# Patient Record
Sex: MALE | Race: WHITE | Employment: FULL TIME | URBAN - METROPOLITAN AREA
[De-identification: names, ages, dates, MRNs, and addresses within clinical notes are randomized per-mention and may not be internally consistent; named-entity substitution may affect disease eponyms.]

---

## 2017-07-12 ENCOUNTER — TRANSFERRED RECORDS (OUTPATIENT)
Dept: HEALTH INFORMATION MANAGEMENT | Facility: CLINIC | Age: 57
End: 2017-07-12

## 2017-07-27 ENCOUNTER — OFFICE VISIT (OUTPATIENT)
Dept: FAMILY MEDICINE | Facility: CLINIC | Age: 57
End: 2017-07-27
Payer: OTHER MISCELLANEOUS

## 2017-07-27 VITALS
HEIGHT: 73 IN | BODY MASS INDEX: 31.01 KG/M2 | TEMPERATURE: 97 F | WEIGHT: 234 LBS | OXYGEN SATURATION: 96 % | SYSTOLIC BLOOD PRESSURE: 126 MMHG | HEART RATE: 60 BPM | DIASTOLIC BLOOD PRESSURE: 76 MMHG

## 2017-07-27 DIAGNOSIS — Z23 NEED FOR TDAP VACCINATION: ICD-10-CM

## 2017-07-27 DIAGNOSIS — Z48.02 ENCOUNTER FOR REMOVAL OF SUTURES: ICD-10-CM

## 2017-07-27 DIAGNOSIS — S61.411D LACERATION OF RIGHT HAND WITHOUT FOREIGN BODY, SUBSEQUENT ENCOUNTER: Primary | ICD-10-CM

## 2017-07-27 PROCEDURE — 99203 OFFICE O/P NEW LOW 30 MIN: CPT | Mod: 25 | Performed by: FAMILY MEDICINE

## 2017-07-27 PROCEDURE — 90715 TDAP VACCINE 7 YRS/> IM: CPT | Performed by: FAMILY MEDICINE

## 2017-07-27 PROCEDURE — 90471 IMMUNIZATION ADMIN: CPT | Performed by: FAMILY MEDICINE

## 2017-07-27 NOTE — PROGRESS NOTES
SUBJECTIVE:                                                    Boris Perry is a 56 year old male who presents to clinic today for the following health issues:      ED/UC Followup:    Facility:  [facility]  Date of visit: 7/12/17  Reason for visit: Patient cut the knuckle on the index finger on right hand and it wouldn't stop bleeding  Current Status: Needs stitch removal       He suffered a cut at work 15 days ago on the dorsum of his right hand. He had 4 sutures placed. He was advised to return in 10-14 days for suture removal.  I note that he is due for a tetanus booster. They did not give him one in the ER.  The wound seems to be healing fine for him.    Problem list and histories reviewed & adjusted, as indicated.  Additional history: as documented    Patient Active Problem List   Diagnosis     MEDIAL MENISCUS TEAR - left     CHONDROMALACIA, KNEE - bilateral     S/P arthroscopy of right knee     Hyperlipidemia with target LDL less than 130     Advanced directives, counseling/discussion     S/P arthroscopy of left knee     Obesity (BMI 30.0-34.9)     Past Surgical History:   Procedure Laterality Date     ARTHROSCOPY KNEE Left      ARTHROSCOPY KNEE Right     Dr. Correa     ARTHROSCOPY KNEE Left 2/27/2015    Procedure: ARTHROSCOPY KNEE;  Surgeon: Reagan Youssef MD;  Location: [facility] OR     C NONSPECIFIC PROCEDURE  1999    RT Middle finger tendon repair     COLONOSCOPY  3-10-15    Repeat Colonoscopy in 5  yrs.     HC KNEE SCOPE,MED/LAT MENISECTOMY  9/10/10    right, medial mensicus     HC KNEE SCOPE,MED/LAT MENISECTOMY Left 2/27/15    Partial medial only - DML        Social History   Substance Use Topics     Smoking status: Never Smoker     Smokeless tobacco: Never Used     Alcohol use No     History reviewed. No pertinent family history.      Current Outpatient Prescriptions   Medication Sig Dispense Refill     Naproxen Sodium (ALEVE PO) Take 220 mg by mouth 2 tablets twice daily       Naproxen  "Sod-Diphenhydramine (ALEVE PM) 220-25 MG TABS Take by mouth as needed       No Known Allergies      Reviewed and updated as needed this visit by clinical staffTobacco  Allergies  Meds  Med Hx  Surg Hx  Fam Hx  Soc Hx      Reviewed and updated as needed this visit by Provider         ROS:  Noncontributory except as above    OBJECTIVE:     /76 (BP Location: Right arm, Patient Position: Chair, Cuff Size: Adult Regular)  Pulse 60  Temp 97  F (36.1  C) (Oral)  Ht 6' 1.43\" (1.865 m)  Wt 234 lb (106.1 kg)  SpO2 96%  BMI 30.52 kg/m2  Body mass index is 30.52 kg/(m^2).  GENERAL: healthy, alert and no distress  SKIN: He has a nicely healing wound over the dorsum of his right hand overlying the distal second metacarpal and MCP joint there are 4 sutures present. They were removed without difficulty. No sign of wound infection..     Diagnostic Test Results:  His ER note was pulled up via care everywhere and reviewed     ASSESSMENT/PLAN:       ICD-10-CM    1. Laceration of right hand without foreign body, subsequent encounter S61.411D    2. Encounter for removal of sutures Z48.02    3. Need for Tdap vaccination Z23 TDAP VACCINE (ADACEL)     ADMIN 1st VACCINE     His right hand laceration is healing nicely   Continue regular work duties and other activities as tolerated  We will give him a tetanus booster today  Recheck prn    Reagan Whitehead MD  [facility]  "

## 2017-07-27 NOTE — MR AVS SNAPSHOT
"              After Visit Summary   7/27/2017    Boris Perry    MRN: [id number]           Patient Information     Date Of Birth          1960        Visit Information        Provider Department      7/27/2017 9:20 AM Reagan Whitehead MD [facility]        Today's Diagnoses     Laceration of right hand without foreign body, subsequent encounter    -  1    Encounter for removal of sutures        Need for Tdap vaccination           Follow-ups after your visit        Follow-up notes from your care team     Return if symptoms worsen or fail to improve.      Who to contact     If you have questions or need follow up information about today's clinic visit or your schedule please contact [facility] directly at [phone].  Normal or non-critical lab and imaging results will be communicated to you by MyChart, letter or phone within 4 business days after the clinic has received the results. If you do not hear from us within 7 days, please contact the clinic through MyChart or phone. If you have a critical or abnormal lab result, we will notify you by phone as soon as possible.  Submit refill requests through [employer] or call your pharmacy and they will forward the refill request to us. Please allow 3 business days for your refill to be completed.          Additional Information About Your Visit        MyChart Information     [employer] lets you send messages to your doctor, view your test results, renew your prescriptions, schedule appointments and more. To sign up, go to [email or web address] . Click on \"Log in\" on the left side of the screen, which will take you to the Welcome page. Then click on \"Sign up Now\" on the right side of the page.     You will be asked to enter the access code listed below, as well as some personal information. Please follow the directions to create your username and password.     Your access code is: [email or web address]  Expires: 10/25/2017  9:45 AM   " "  Your access code will  in 90 days. If you need help or a new code, please call your [place] clinic or [phone].        Care EveryWhere ID     This is your Care EveryWhere ID. This could be used by other organizations to access your [place] medical records  [id number]        Your Vitals Were     Pulse Temperature Height Pulse Oximetry BMI (Body Mass Index)       60 97  F (36.1  C) (Oral) 6' 1.43\" (1.865 m) 96% 30.52 kg/m2        Blood Pressure from Last 3 Encounters:   17 126/76   16 117/79   02/27/15 130/80    Weight from Last 3 Encounters:   17 234 lb (106.1 kg)   16 231 lb 8 oz (105 kg)   03/05/15 227 lb (103 kg)              We Performed the Following     ADMIN 1st VACCINE     TDAP VACCINE (ADACEL)        Primary Care Provider Office Phone # Fax #    Reagan Whitehead [phone]       [facility] [address] [facility] [address]        Equal Access to Services     [facility] Permanente Medical CenterNAVEEN : Hadii aad ku hadasho Soomaali, waaxda luqadaha, qaybta kaalmada adeegyada, carlos rubio . So [facility] [phone].    ATENCIÓN: Si habla español, tiene a rhodes disposición servicios gratuitos de asistencia lingüística. Llame al [phone].    We comply with applicable federal civil rights laws and [place] laws. We do not discriminate on the basis of race, color, national origin, age, disability sex, sexual orientation or gender identity.            Thank you!     Thank you for choosing [facility]  for your care. Our goal is always to provide you with excellent care. Hearing back from our patients is one way we can continue to improve our services. Please take a few minutes to complete the written survey that you may receive in the mail after your visit with us. Thank you!             Your Updated Medication List - Protect others around you: Learn how to safely use, store and throw away your medicines at " www.disposemymeds.org.          This list is accurate as of: 7/27/17  9:45 AM.  Always use your most recent med list.                   Brand Name Dispense Instructions for use Diagnosis    ALEVE -25 MG Tabs   Generic drug:  Naproxen Sod-Diphenhydramine      Take by mouth as needed        ALEVE PO      Take 220 mg by mouth 2 tablets twice daily

## 2017-07-27 NOTE — NURSING NOTE
Screening Questionnaire for Adult Immunization    Are you sick today?   No   Do you have allergies to medications, food, a vaccine component or latex?   No   Have you ever had a serious reaction after receiving a vaccination?   No   Do you have a long-term health problem with heart disease, lung disease, asthma, kidney disease, metabolic disease (e.g. diabetes), anemia, or other blood disorder?   No   Do you have cancer, leukemia, HIV/AIDS, or any other immune system problem?   No   In the past 3 months, have you taken medications that affect  your immune system, such as prednisone, other steroids, or anticancer drugs; drugs for the treatment of rheumatoid arthritis, Crohn s disease, or psoriasis; or have you had radiation treatments?   No   Have you had a seizure, or a brain or other nervous system problem?   No   During the past year, have you received a transfusion of blood or blood     products, or been given immune (gamma) globulin or antiviral drug?   No   For women: Are you pregnant or is there a chance you could become        pregnant during the next month?   No   Have you received any vaccinations in the past 4 weeks?   No     Immunization questionnaire answers were all negative.      MNVFC doesn't apply on this patient    Patient instructed to remain in clinic for 15 minutes afterwards, and to report any adverse reaction to me immediately.    Prior to injection verified patient identity using patient's name and date of birth.    Vaccine information supplied.     Screening performed by Kristi Ruth on 7/27/2017 at 9:54 AM.

## 2017-08-08 ENCOUNTER — OFFICE VISIT (OUTPATIENT)
Dept: FAMILY MEDICINE | Facility: CLINIC | Age: 57
End: 2017-08-08
Payer: COMMERCIAL

## 2017-08-08 VITALS
BODY MASS INDEX: 30.62 KG/M2 | TEMPERATURE: 97 F | DIASTOLIC BLOOD PRESSURE: 73 MMHG | WEIGHT: 231 LBS | HEART RATE: 70 BPM | HEIGHT: 73 IN | OXYGEN SATURATION: 95 % | SYSTOLIC BLOOD PRESSURE: 106 MMHG

## 2017-08-08 DIAGNOSIS — M25.562 CHRONIC PAIN OF LEFT KNEE: ICD-10-CM

## 2017-08-08 DIAGNOSIS — E66.811 OBESITY (BMI 30.0-34.9): ICD-10-CM

## 2017-08-08 DIAGNOSIS — Z11.59 NEED FOR HEPATITIS C SCREENING TEST: ICD-10-CM

## 2017-08-08 DIAGNOSIS — Z00.00 ROUTINE GENERAL MEDICAL EXAMINATION AT A HEALTH CARE FACILITY: Primary | ICD-10-CM

## 2017-08-08 DIAGNOSIS — G89.29 CHRONIC PAIN OF LEFT KNEE: ICD-10-CM

## 2017-08-08 DIAGNOSIS — E78.5 HYPERLIPIDEMIA WITH TARGET LDL LESS THAN 130: ICD-10-CM

## 2017-08-08 LAB
CHOLEST SERPL-MCNC: 230 MG/DL
GLUCOSE SERPL-MCNC: 97 MG/DL (ref 70–99)
HCV AB SERPL QL IA: NORMAL
HDLC SERPL-MCNC: 41 MG/DL
LDLC SERPL CALC-MCNC: 142 MG/DL
NONHDLC SERPL-MCNC: 189 MG/DL
PSA SERPL-ACNC: 1.26 UG/L (ref 0–4)
TRIGL SERPL-MCNC: 237 MG/DL

## 2017-08-08 PROCEDURE — 99396 PREV VISIT EST AGE 40-64: CPT | Mod: 25 | Performed by: FAMILY MEDICINE

## 2017-08-08 PROCEDURE — G0103 PSA SCREENING: HCPCS | Performed by: FAMILY MEDICINE

## 2017-08-08 PROCEDURE — 36415 COLL VENOUS BLD VENIPUNCTURE: CPT | Performed by: FAMILY MEDICINE

## 2017-08-08 PROCEDURE — 20610 DRAIN/INJ JOINT/BURSA W/O US: CPT | Mod: LT | Performed by: FAMILY MEDICINE

## 2017-08-08 PROCEDURE — 86803 HEPATITIS C AB TEST: CPT | Performed by: FAMILY MEDICINE

## 2017-08-08 PROCEDURE — 82947 ASSAY GLUCOSE BLOOD QUANT: CPT | Performed by: FAMILY MEDICINE

## 2017-08-08 PROCEDURE — 80061 LIPID PANEL: CPT | Performed by: FAMILY MEDICINE

## 2017-08-08 PROCEDURE — 99212 OFFICE O/P EST SF 10 MIN: CPT | Mod: 25 | Performed by: FAMILY MEDICINE

## 2017-08-08 NOTE — NURSING NOTE
"Chief Complaint   Patient presents with     Physical       Initial /73 (BP Location: Right arm, Patient Position: Chair, Cuff Size: Adult Large)  Pulse 70  Temp 97  F (36.1  C) (Oral)  Ht 6' 0.5\" (1.842 m)  Wt 231 lb (104.8 kg)  SpO2 95%  BMI 30.9 kg/m2 Estimated body mass index is 30.9 kg/(m^2) as calculated from the following:    Height as of this encounter: 6' 0.5\" (1.842 m).    Weight as of this encounter: 231 lb (104.8 kg).  Medication Reconciliation: complete   Jemima Klein CMA       "

## 2017-08-08 NOTE — LETTER
[facility]  [address] [place]  [address]  [phone]        August 9, 2017    Boris Perry  [address]  [facility] [address]        Dear Boris,    Your hepatitis C screening antibody test is normal/negative. Your PSA prostate cancer screening test is normal. Cholesterol values are a bit higher than last year. Blood sugar looks good.  Healthy eating and regular exercise would be appropriate treatment for your cholesterol.    Results for orders placed or performed in visit on 08/08/17   Hepatitis C Screen Reflex to HCV RNA Quant and Genotype   Result Value Ref Range    Hepatitis C Antibody  NR     Nonreactive   Assay performance characteristics have not been established for newborns,   infants, and children     Glucose   Result Value Ref Range    Glucose 97 70 - 99 mg/dL   Lipid panel reflex to direct LDL   Result Value Ref Range    Cholesterol 230 (H) <200 mg/dL    Triglycerides 237 (H) <150 mg/dL    HDL Cholesterol 41 >39 mg/dL    LDL Cholesterol Calculated 142 (H) <100 mg/dL    Non HDL Cholesterol 189 (H) <130 mg/dL   Prostate spec antigen screen   Result Value Ref Range    PSA 1.26 0 - 4 ug/L       If you have any questions please call the clinic at [phone].    Sincerely,    Reagan PENN

## 2017-08-08 NOTE — MR AVS SNAPSHOT
After Visit Summary   8/8/2017    Boris Perry    MRN: [id number]           Patient Information     Date Of Birth          1960        Visit Information        Provider Department      8/8/2017 8:00 AM Reagan Whitehead MD [facility]        Today's Diagnoses     Routine general medical examination at a health care facility    -  1    Chronic pain of left knee        Hyperlipidemia with target LDL less than 130        Need for hepatitis C screening test          Care Instructions      Preventive Health Recommendations  Male Ages 50 - 64    Yearly exam:             See your health care provider every year in order to  o   Review health changes.   o   Discuss preventive care.    o   Review your medicines if your doctor has prescribed any.     Have a cholesterol test every 5 years, or more frequently if you are at risk for high cholesterol/heart disease.     Have a diabetes test (fasting glucose) every three years. If you are at risk for diabetes, you should have this test more often.     Have a colonoscopy at age 50, or have a yearly FIT test (stool test). These exams will check for colon cancer.      Talk with your health care provider about whether or not a prostate cancer screening test (PSA) is right for you.    You should be tested each year for STDs (sexually transmitted diseases), if you re at risk.     Shots: Get a flu shot each year. Get a tetanus shot every 10 years.     Nutrition:    Eat at least 5 servings of fruits and vegetables daily.     Eat whole-grain bread, whole-wheat pasta and brown rice instead of white grains and rice.     Talk to your provider about Calcium and Vitamin D.     Lifestyle    Exercise for at least 150 minutes a week (30 minutes a day, 5 days a week). This will help you control your weight and prevent disease.     Limit alcohol to one drink per day.     No smoking.     Wear sunscreen to prevent skin cancer.     See your dentist every six  "months for an exam and cleaning.     See your eye doctor every 1 to 2 years.            Follow-ups after your visit        Follow-up notes from your care team     Return in about 1 year (around 2018).      Who to contact     If you have questions or need follow up information about today's clinic visit or your schedule please contact [facility] directly at [phone].  Normal or non-critical lab and imaging results will be communicated to you by MyChart, letter or phone within 4 business days after the clinic has received the results. If you do not hear from us within 7 days, please contact the clinic through [employer] or phone. If you have a critical or abnormal lab result, we will notify you by phone as soon as possible.  Submit refill requests through [employer] or call your pharmacy and they will forward the refill request to us. Please allow 3 business days for your refill to be completed.          Additional Information About Your Visit        [employer] Information     [employer] lets you send messages to your doctor, view your test results, renew your prescriptions, schedule appointments and more. To sign up, go to [email or web address] . Click on \"Log in\" on the left side of the screen, which will take you to the Welcome page. Then click on \"Sign up Now\" on the right side of the page.     You will be asked to enter the access code listed below, as well as some personal information. Please follow the directions to create your username and password.     Your access code is: [email or web address]  Expires: 10/25/2017  9:45 AM     Your access code will  in 90 days. If you need help or a new code, please call your [place] clinic or [phone].        Care EveryWhere ID     This is your Care EveryWhere ID. This could be used by other organizations to access your [place] medical records  [id number]        Your Vitals Were     Pulse Temperature Height Pulse Oximetry BMI (Body Mass Index)    " "   70 97  F (36.1  C) (Oral) 6' 0.5\" (1.842 m) 95% 30.9 kg/m2        Blood Pressure from Last 3 Encounters:   08/08/17 106/73   07/27/17 126/76   08/24/16 117/79    Weight from Last 3 Encounters:   08/08/17 231 lb (104.8 kg)   07/27/17 234 lb (106.1 kg)   08/24/16 231 lb 8 oz (105 kg)              We Performed the Following     Glucose     Hepatitis C Screen Reflex to HCV RNA Quant and Genotype     Lipid panel reflex to direct LDL     Prostate spec antigen screen        Primary Care Provider Office Phone # Fax #    Reagan Whitehead [phone]       [facility] [address] [place], [place]. \[facility].\"" [id number]        Equal Access to Services     DEBORAH BRASHER : Hadii aad ku hadasho Soalejandro, waaxda luqadaha, qaybta kaalmada adeegyada, waxjessy fongin haybeth rubio . So [facility] [phone].    ATENCIÓN: Si habla español, tiene a rhodes disposición servicios gratuitos de asistencia lingüística. Llame al [phone].    We comply with applicable federal civil rights laws and [place] laws. We do not discriminate on the basis of race, color, national origin, age, disability sex, sexual orientation or gender identity.            Thank you!     Thank you for choosing [facility]  for your care. Our goal is always to provide you with excellent care. Hearing back from our patients is one way we can continue to improve our services. Please take a few minutes to complete the written survey that you may receive in the mail after your visit with us. Thank you!             Your Updated Medication List - Protect others around you: Learn how to safely use, store and throw away your medicines at www.disposemymeds.org.      Notice  As of 8/8/2017  8:38 AM    You have not been prescribed any medications.      "

## 2017-08-08 NOTE — PROGRESS NOTES
SUBJECTIVE:   CC: Boris Perry is an 56 year old male who presents for a preventative health visit and to address a couple of health concerns.     Physical   Annual:     Getting at least 3 servings of Calcium per day::  Yes    Bi-annual eye exam::  Yes    Dental care twice a year::  Yes    Sleep apnea or symptoms of sleep apnea::  None    Diet::  Regular (no restrictions)    Frequency of exercise::  1 day/week    Duration of exercise::  15-30 minutes    Taking medications regularly::  Yes    Medication side effects::  None    Additional concerns today::  No      Other concerns:   Left side of his neck and shoulder has a pulsing feeling to it. Sort of like a muscle twitching.    He's had some knee discomfort in the past. Left knee is bothering him currently. He has had arthroscopy done a number of times in both knees. He is interested in trying a cortisone shot for the left knee.    Today's PHQ-2 Score: PHQ-2 ( 1999 Pfizer) 8/8/2017   Q1: Little interest or pleasure in doing things 0   Q2: Feeling down, depressed or hopeless 0   PHQ-2 Score 0   Q1: Little interest or pleasure in doing things Not at all   Q2: Feeling down, depressed or hopeless Not at all   PHQ-2 Score 0       Abuse: Current or Past(Physical, Sexual or Emotional)- No  Do you feel safe in your environment - Yes    Social History   Substance Use Topics     Smoking status: Never Smoker     Smokeless tobacco: Never Used     Alcohol use No     The patient does not drink >3 drinks per day nor >7 drinks per week.    Last PSA: No results found for: PSA    Reviewed orders with patient. Reviewed health maintenance and updated orders accordingly - Yes  Patient Active Problem List   Diagnosis     MEDIAL MENISCUS TEAR - left     CHONDROMALACIA, KNEE - bilateral     S/P arthroscopy of right knee     Hyperlipidemia with target LDL less than 130     Advanced directives, counseling/discussion     S/P arthroscopy of left knee     Obesity (BMI 30.0-34.9)     Chronic  "pain of left knee     Past Surgical History:   Procedure Laterality Date     ARTHROSCOPY KNEE Left      ARTHROSCOPY KNEE Right     Dr. Correa     ARTHROSCOPY KNEE Left 2/27/2015    Procedure: ARTHROSCOPY KNEE;  Surgeon: Reagan Youssef MD;  Location: [facility] OR     C NONSPECIFIC PROCEDURE  1999    RT Middle finger tendon repair     COLONOSCOPY  3-10-15    Repeat Colonoscopy in 5  yrs.     HC KNEE SCOPE,MED/LAT MENISECTOMY  9/10/10    right, medial mensicus     HC KNEE SCOPE,MED/LAT MENISECTOMY Left 2/27/15    Partial medial only - DML        Social History   Substance Use Topics     Smoking status: Never Smoker     Smokeless tobacco: Never Used     Alcohol use No     History reviewed. No pertinent family history.      No current outpatient prescriptions on file.     No Known Allergies      Reviewed and updated as needed this visit by clinical staffTobacco  Allergies  Med Hx  Surg Hx  Fam Hx  Soc Hx        Reviewed and updated as needed this visit by Provider              ROS:  C: NEGATIVE for fever, chills, change in weight  I: NEGATIVE for worrisome rashes, moles or lesions  E: NEGATIVE for vision changes or irritation  ENT: NEGATIVE for ear, mouth and throat problems  R: NEGATIVE for significant cough or SOB  CV: NEGATIVE for chest pain, palpitations or peripheral edema  GI: NEGATIVE for nausea, abdominal pain, heartburn, or change in bowel habits   male: negative for dysuria, hematuria, decreased urinary stream, erectile dysfunction, urethral discharge  MUSCULOSKELETAL: See above  N: NEGATIVE for weakness, dizziness or paresthesias  P: NEGATIVE for changes in mood or affect    OBJECTIVE:   /73 (BP Location: Right arm, Patient Position: Chair, Cuff Size: Adult Large)  Pulse 70  Temp 97  F (36.1  C) (Oral)  Ht 6' 0.5\" (1.842 m)  Wt 231 lb (104.8 kg)  SpO2 95%  BMI 30.9 kg/m2    EXAM:  GENERAL: alert, no distress and over weight  EYES: Eyes grossly normal to inspection, PERRL and conjunctivae and " sclerae normal  HENT: ear canals and TM's normal, nose and mouth without ulcers or lesions  NECK: no adenopathy, no asymmetry, masses, or scars and thyroid normal to palpation. No carotid bruits. No specific tenderness over the left lower neck.  RESP: lungs clear to auscultation - no rales, rhonchi or wheezes  CV: regular rate and rhythm, normal S1 S2, no S3 or S4, no murmur, click or rub, no peripheral edema and peripheral pulses strong  ABDOMEN: soft, nontender, no hepatosplenomegaly, no masses   RECTAL: normal sphincter tone, no rectal masses, prostate normal size, smooth, nontender without nodules or masses  MS: No swelling or warmth or erythema of the knees. He does have mild genu varus of both knees. After discussion of various treatment options, and after reviewing risks and benefits and potential complications, we elected to proceed with a cortisone injection.  The lateral aspect of the Left knee was cleansed and then injected with my usual mixture of 1 cc of 80 mg/cc Depomedrol suspension along with 2 cc of 1% Lidocaine without epi and 2 cc of 0.5% Marcaine.  SKIN: no suspicious lesions or rashes  NEURO: Normal strength and tone, mentation intact and speech normal  PSYCH: mentation appears normal, affect normal/bright    ASSESSMENT/PLAN:       ICD-10-CM    1. Routine general medical examination at a health care facility Z00.00 Glucose     Lipid panel reflex to direct LDL     Prostate spec antigen screen   2. Chronic pain of left knee M25.562 DRAIN/INJECT LARGE JOINT/BURSA    G89.29 METHYLPREDNISOLONE 80 MG INJ   3. Hyperlipidemia with target LDL less than 130 E78.5    4. Obesity (BMI 30.0-34.9) E66.9    5. Need for hepatitis C screening test Z11.59 Hepatitis C Screen Reflex to HCV RNA Quant and Genotype     We'll see how the cortisone shot works for his left knee  We'll check fasting labs today as above  Diet and exercise treatment for weight loss and control of lipids  Use over-the-counter analgesics as  "needed for knee discomfort  Plan a recheck in one year, or sooner prn    COUNSELING:   Reviewed preventive health counseling, as reflected in patient instructions       Regular exercise       Healthy diet/nutrition       reports that he has never smoked. He has never used smokeless tobacco.      Estimated body mass index is 30.9 kg/(m^2) as calculated from the following:    Height as of this encounter: 6' 0.5\" (1.842 m).    Weight as of this encounter: 231 lb (104.8 kg).   Weight management plan: Discussed healthy diet and exercise guidelines and patient will follow up in 12 months in clinic to re-evaluate.    Counseling Resources:  ATP IV Guidelines  Pooled Cohorts Equation Calculator  FRAX Risk Assessment  ICSI Preventive Guidelines  Dietary Guidelines for Americans, 2010  USDA's MyPlate  ASA Prophylaxis  Lung CA Screening    Reagan Whitehead MD  [facility]  Answers for HPI/ROS submitted by the patient on 8/8/2017   PHQ-2 Score: 0    "

## 2017-08-09 NOTE — PROGRESS NOTES
Boris,  Your hepatitis C screening antibody test is normal/negative. Your PSA prostate cancer screening test is normal. Cholesterol values are a bit higher than last year. Blood sugar looks good.  Healthy eating and regular exercise would be appropriate treatment for your cholesterol.    Reagan Whitehead MD

## 2018-10-10 ENCOUNTER — OFFICE VISIT (OUTPATIENT)
Dept: FAMILY MEDICINE | Facility: CLINIC | Age: 58
End: 2018-10-10
Payer: COMMERCIAL

## 2018-10-10 VITALS
SYSTOLIC BLOOD PRESSURE: 125 MMHG | OXYGEN SATURATION: 93 % | HEIGHT: 73 IN | BODY MASS INDEX: 31.81 KG/M2 | TEMPERATURE: 97.3 F | HEART RATE: 67 BPM | DIASTOLIC BLOOD PRESSURE: 82 MMHG | WEIGHT: 240 LBS

## 2018-10-10 DIAGNOSIS — M25.562 LEFT KNEE PAIN, UNSPECIFIED CHRONICITY: Primary | ICD-10-CM

## 2018-10-10 DIAGNOSIS — Z23 NEED FOR PROPHYLACTIC VACCINATION AND INOCULATION AGAINST INFLUENZA: ICD-10-CM

## 2018-10-10 DIAGNOSIS — E66.811 OBESITY (BMI 30.0-34.9): ICD-10-CM

## 2018-10-10 DIAGNOSIS — Z98.890 S/P ARTHROSCOPY OF LEFT KNEE: ICD-10-CM

## 2018-10-10 PROBLEM — G89.29 CHRONIC PAIN OF LEFT KNEE: Status: RESOLVED | Noted: 2017-08-08 | Resolved: 2018-10-10

## 2018-10-10 PROCEDURE — 20610 DRAIN/INJ JOINT/BURSA W/O US: CPT | Mod: LT | Performed by: FAMILY MEDICINE

## 2018-10-10 PROCEDURE — 90686 IIV4 VACC NO PRSV 0.5 ML IM: CPT | Performed by: FAMILY MEDICINE

## 2018-10-10 PROCEDURE — 90471 IMMUNIZATION ADMIN: CPT | Performed by: FAMILY MEDICINE

## 2018-10-10 PROCEDURE — 99213 OFFICE O/P EST LOW 20 MIN: CPT | Mod: 25 | Performed by: FAMILY MEDICINE

## 2018-10-10 RX ORDER — DICLOFENAC SODIUM 75 MG/1
75 TABLET, DELAYED RELEASE ORAL 2 TIMES DAILY PRN
Qty: 40 TABLET | Refills: 1 | Status: SHIPPED | OUTPATIENT
Start: 2018-10-10 | End: 2021-04-02

## 2018-10-10 ASSESSMENT — PAIN SCALES - GENERAL: PAINLEVEL: EXTREME PAIN (8)

## 2018-10-10 NOTE — MR AVS SNAPSHOT
After Visit Summary   10/10/2018    Boris Perry    MRN: [id number]           Patient Information     Date Of Birth          1960        Visit Information        Provider Department      10/10/2018 12:40 PM Reagan Whitehead MD [facility]        Today's Diagnoses     Left knee pain, unspecified chronicity    -  1    Need for prophylactic vaccination and inoculation against influenza           Follow-ups after your visit        Additional Services     BILL PT, HAND, AND CHIROPRACTIC REFERRAL       Physical Therapy, Hand Therapy and Chiropractic Care are available through:  *[facility]  *Hand Therapy (Occupational Therapy or Physical Therapy)  *[facility]    Call one number to schedule at any of the above locations: [phone].    Physical therapy, Hand therapy and/or Chiropractic care has been recommended by your physician as an excellent treatment option to reduce pain and help people return to normal activities, including sports.  Therapy and/or chiropractic care services are a great complement or alternative to expensive and invasive surgery, injections, or long-term use of prescription medications. The primary goal is to identify the underlying problem and provide you the tools to manage your condition on your own.     Please be aware that coverage of these services is subject to the terms and limitations of your health insurance plan.  Call member services at your health plan with any benefit or coverage questions.      Please bring the following to your appointment:  *Your personal calendar for scheduling future appointments  *Comfortable clothing                  Follow-up notes from your care team     Return if symptoms worsen or fail to improve.      Future tests that were ordered for you today     Open Future Orders        Priority Expected Expires Ordered    BILL PT, HAND, AND CHIROPRACTIC REFERRAL Routine   "10/10/2019 10/10/2018            Who to contact     If you have questions or need follow up information about today's clinic visit or your schedule please contact [facility] directly at [phone].  Normal or non-critical lab and imaging results will be communicated to you by MyChart, letter or phone within 4 business days after the clinic has received the results. If you do not hear from us within 7 days, please contact the clinic through MyChart or phone. If you have a critical or abnormal lab result, we will notify you by phone as soon as possible.  Submit refill requests through [employer] or call your pharmacy and they will forward the refill request to us. Please allow 3 business days for your refill to be completed.          Additional Information About Your Visit        [employer] Directory Information     [employer] lets you send messages to your doctor, view your test results, renew your prescriptions, schedule appointments and more. To sign up, go to [email or web address] . Click on \"Log in\" on the left side of the screen, which will take you to the Welcome page. Then click on \"Sign up Now\" on the right side of the page.     You will be asked to enter the access code listed below, as well as some personal information. Please follow the directions to create your username and password.     Your access code is: [email or web address]  Expires: 2019  1:13 PM     Your access code will  in 90 days. If you need help or a new code, please call your [facility] or [phone].        Care EveryWhere ID     This is your Care EveryWhere ID. This could be used by other organizations to access your [place] medical records  [id number]        Your Vitals Were     Pulse Temperature Height Pulse Oximetry BMI (Body Mass Index)       67 97.3  F (36.3  C) (Oral) 6' 0.64\" (1.845 m) 93% 31.98 kg/m2        Blood Pressure from Last 3 Encounters:   10/10/18 125/82   17 106/73   17 126/76    Weight from " Last 3 Encounters:   10/10/18 240 lb (108.9 kg)   08/08/17 231 lb (104.8 kg)   07/27/17 234 lb (106.1 kg)              We Performed the Following     FLU VACCINE, SPLIT VIRUS, IM (QUADRIVALENT) [97605]- >3 YRS     Vaccine Administration, Initial [99653]          Today's Medication Changes          These changes are accurate as of 10/10/18  1:13 PM.  If you have any questions, ask your nurse or doctor.               Start taking these medicines.        Dose/Directions    diclofenac 75 MG EC tablet   Commonly known as:  VOLTAREN   Used for:  Left knee pain, unspecified chronicity   Started by:  Reagan Whitehead MD        Dose:  75 mg   Take 1 tablet (75 mg) by mouth 2 times daily as needed for moderate pain   Quantity:  40 tablet   Refills:  1            Where to get your medicines      These medications were sent to [facility]/pharmacy #[id number] - [place], [place] - [facility] [facility] [address] [facility] [address], [place] [address]     Phone:  [phone]     diclofenac 75 MG EC tablet                Primary Care Provider Office Phone # Fax #    Reagan Whitehead [phone]       [address] [facility] [address]        Equal Access to Services     DEBORAH BRASHER AH: Hadghislaine almaguero Sojacali, waaxda luqadaha, qaybta kaalmada adeegyada, carlos del angel. So [facility] [phone].    ATENCIÓN: Si habla español, tiene a rhodes disposición servicios gratuitos de asistencia lingüística. Llame al [phone].    We comply with applicable federal civil rights laws and [place] laws. We do not discriminate on the basis of race, color, national origin, age, disability, sex, sexual orientation, or gender identity.            Thank you!     Thank you for choosing [facility]  for your care. Our goal is always to provide you with excellent care. Hearing back from our patients is one way we can continue to improve our services. Please take a few  minutes to complete the written survey that you may receive in the mail after your visit with us. Thank you!             Your Updated Medication List - Protect others around you: Learn how to safely use, store and throw away your medicines at www.disposemymeds.org.          This list is accurate as of 10/10/18  1:13 PM.  Always use your most recent med list.                   Brand Name Dispense Instructions for use Diagnosis    diclofenac 75 MG EC tablet    VOLTAREN    40 tablet    Take 1 tablet (75 mg) by mouth 2 times daily as needed for moderate pain    Left knee pain, unspecified chronicity

## 2018-10-10 NOTE — PROGRESS NOTES
SUBJECTIVE:   Boris Perry is a 58 year old male who presents to clinic today for the following health issues:        Left knee Pain    Onset: last 2 weeks got worse, 3 years ago had surgery    Description:   Location: left knee  Character: Sharp    Intensity: moderate, severe when walking    Progression of Symptoms: worse    Accompanying Signs & Symptoms:  Other symptoms: radiation of pain to back thigh    History:   Previous similar pain: YES      Precipitating factors:   Trauma or overuse: no     Alleviating factors:  Improved by: nothing    Therapies Tried and outcome: Aleve, Advil not helping.    He has had a couple of left knee arthroscopies in the past, including the most recent one from a few years ago.  He did not feel like that surgery really helped him very much..  He is not experiencing catching or locking, but he has difficulty fully extending his left leg.  Most of the pain is felt in the posterior knee.    Problem list and histories reviewed & adjusted, as indicated.  Additional history: as documented    Patient Active Problem List   Diagnosis     MEDIAL MENISCUS TEAR - left     CHONDROMALACIA, KNEE - bilateral     S/P arthroscopy of right knee     Hyperlipidemia with target LDL less than 130     Advanced directives, counseling/discussion     S/P arthroscopy of left knee     Obesity (BMI 30.0-34.9)     Past Surgical History:   Procedure Laterality Date     ARTHROSCOPY KNEE Left 10/01/2008    Dr. Correa     ARTHROSCOPY KNEE Right     Dr. Correa     ARTHROSCOPY KNEE Left 2/27/2015    Procedure: ARTHROSCOPY KNEE;  Surgeon: Reagan Youssef MD;  Location: [facility] OR     C NONSPECIFIC PROCEDURE  1999    RT Middle finger tendon repair     COLONOSCOPY  3-10-15    Repeat Colonoscopy in 5  yrs.     HC KNEE SCOPE,MED/LAT MENISECTOMY Right 09/10/2010    medial mensicus -- Dr. Youssef       Social History   Substance Use Topics     Smoking status: Never Smoker     Smokeless tobacco: Never Used     Alcohol  "use No     History reviewed. No pertinent family history.      Current Outpatient Prescriptions   Medication Sig Dispense Refill       1     No Known Allergies    Reviewed and updated as needed this visit by clinical staff  Tobacco  Allergies  Meds  Med Hx  Surg Hx  Fam Hx  Soc Hx      Reviewed and updated as needed this visit by Provider         ROS:  Constitutional, HEENT, cardiovascular, pulmonary, gi and gu systems are negative, except as otherwise noted.    OBJECTIVE:     /82 (BP Location: Right arm, Patient Position: Chair, Cuff Size: Adult Large)  Pulse 67  Temp 97.3  F (36.3  C) (Oral)  Ht 6' 0.64\" (1.845 m)  Wt 240 lb (108.9 kg)  SpO2 93%  BMI 31.98 kg/m2  Body mass index is 31.98 kg/(m^2).  GENERAL: alert, no distress and over weight  MS: No obvious joint effusion with the left knee.  He lacks about 10 degrees of complete extension and complete flexion.  No point tenderness over the knee.  Ligaments are intact.  I do not detect any positive Hector's.  After discussion of various treatment options, and after reviewing risks and benefits and potential complications, we elected to proceed with a cortisone injection.  The lateral aspect of the Left knee was cleansed and then injected with my usual mixture of 1 cc of 80 mg/cc Depomedrol suspension along with 2 cc of 1% Lidocaine without epi and 2 cc of 0.5% Marcaine.    Diagnostic Test Results:  none     ASSESSMENT/PLAN:       ICD-10-CM    1. Left knee pain, unspecified chronicity M25.562 diclofenac (VOLTAREN) 75 MG EC tablet     BILL PT, HAND, AND CHIROPRACTIC REFERRAL     DRAIN/INJECT LARGE JOINT/BURSA     METHYLPREDNISOLONE 80 MG INJ   2. S/P arthroscopy of left knee Z98.890    3. Obesity (BMI 30.0-34.9) E66.9    4. Need for prophylactic vaccination and inoculation against influenza Z23 FLU VACCINE, SPLIT VIRUS, IM (QUADRIVALENT) [34987]- >3 YRS     Vaccine Administration, Initial [27151]     We will see how the cortisone injection works " for him  I will also prescribe diclofenac for him  I doubt any further arthroscopy would be helpful for him at this point, but I did recommend physical therapy for stretching and strengthening exercises and I made a referral for that  Weight loss would be helpful as well  We will give him a flu shot today    If new, worsening or persistent symptoms, the patient is to call or return for a recheck.      Reagan Whitehead MD  [facility]

## 2018-10-10 NOTE — PROGRESS NOTES
Injectable Influenza Immunization Documentation    1.  Is the person to be vaccinated sick today?   No    2. Does the person to be vaccinated have an allergy to a component   of the vaccine?   No  Egg Allergy Algorithm Link    3. Has the person to be vaccinated ever had a serious reaction   to influenza vaccine in the past?   No    4. Has the person to be vaccinated ever had Guillain-Barré syndrome?   No    Form completed by patient   Due to injection administration, patient instructed to remain in clinic for 15 minutes  afterwards, and to report any adverse reaction to me immediately.  Vaccine information supplied.  Kain, SMA

## 2018-10-24 ENCOUNTER — THERAPY VISIT (OUTPATIENT)
Dept: PHYSICAL THERAPY | Facility: CLINIC | Age: 58
End: 2018-10-24
Payer: COMMERCIAL

## 2018-10-24 DIAGNOSIS — M25.562 LEFT KNEE PAIN, UNSPECIFIED CHRONICITY: ICD-10-CM

## 2018-10-24 PROCEDURE — 97110 THERAPEUTIC EXERCISES: CPT | Mod: GP | Performed by: PHYSICAL THERAPIST

## 2018-10-24 PROCEDURE — 97161 PT EVAL LOW COMPLEX 20 MIN: CPT | Mod: GP | Performed by: PHYSICAL THERAPIST

## 2018-10-24 ASSESSMENT — ACTIVITIES OF DAILY LIVING (ADL)
RAW_SCORE: 38
SIT WITH YOUR KNEE BENT: ACTIVITY IS FAIRLY DIFFICULT
STAND: ACTIVITY IS SOMEWHAT DIFFICULT
RISE FROM A CHAIR: ACTIVITY IS FAIRLY DIFFICULT
HOW_WOULD_YOU_RATE_THE_CURRENT_FUNCTION_OF_YOUR_KNEE_DURING_YOUR_USUAL_DAILY_ACTIVITIES_ON_A_SCALE_FROM_0_TO_100_WITH_100_BEING_YOUR_LEVEL_OF_KNEE_FUNCTION_PRIOR_TO_YOUR_INJURY_AND_0_BEING_THE_INABILITY_TO_PERFORM_ANY_OF_YOUR_USUAL_DAILY_ACTIVITIES?: 50
KNEE_ACTIVITY_OF_DAILY_LIVING_SCORE: 54.29
WEAKNESS: THE SYMPTOM AFFECTS MY ACTIVITY SLIGHTLY
PAIN: THE SYMPTOM AFFECTS MY ACTIVITY MODERATELY
SQUAT: ACTIVITY IS SOMEWHAT DIFFICULT
WALK: ACTIVITY IS MINIMALLY DIFFICULT
STIFFNESS: THE SYMPTOM AFFECTS MY ACTIVITY SLIGHTLY
KNEEL ON THE FRONT OF YOUR KNEE: ACTIVITY IS FAIRLY DIFFICULT
HOW_WOULD_YOU_RATE_THE_OVERALL_FUNCTION_OF_YOUR_KNEE_DURING_YOUR_USUAL_DAILY_ACTIVITIES?: ABNORMAL
AS_A_RESULT_OF_YOUR_KNEE_INJURY,_HOW_WOULD_YOU_RATE_YOUR_CURRENT_LEVEL_OF_DAILY_ACTIVITY?: NEARLY NORMAL
KNEE_ACTIVITY_OF_DAILY_LIVING_SUM: 38
GIVING WAY, BUCKLING OR SHIFTING OF KNEE: I HAVE THE SYMPTOM BUT IT DOES NOT AFFECT MY ACTIVITY
GO UP STAIRS: ACTIVITY IS SOMEWHAT DIFFICULT
SWELLING: THE SYMPTOM AFFECTS MY ACTIVITY MODERATELY
LIMPING: THE SYMPTOM AFFECTS MY ACTIVITY MODERATELY
GO DOWN STAIRS: ACTIVITY IS SOMEWHAT DIFFICULT

## 2018-10-24 NOTE — MR AVS SNAPSHOT
"              After Visit Summary   10/24/2018    Boris Perry    MRN: [id number]           Patient Information     Date Of Birth          1960        Visit Information        Provider Department      10/24/2018 9:40 AM Ramon Oh PT [facility]        Today's Diagnoses     Left knee pain, unspecified chronicity           Follow-ups after your visit        Your next 10 appointments already scheduled     Oct 30, 2018  8:30 AM CDT   BILL Extremity with Ramon Oh PT   [facility]    [address]  [facility] [address] [phone]              Who to contact     If you have questions or need follow up information about today's clinic visit or your schedule please contact [facility] directly at [phone].  Normal or non-critical lab and imaging results will be communicated to you by MyChart, letter or phone within 4 business days after the clinic has received the results. If you do not hear from us within 7 days, please contact the clinic through [employer] or phone. If you have a critical or abnormal lab result, we will notify you by phone as soon as possible.  Submit refill requests through [employer] or call your pharmacy and they will forward the refill request to us. Please allow 3 business days for your refill to be completed.          Additional Information About Your Visit        MyChart Information     [employer] lets you send messages to your doctor, view your test results, renew your prescriptions, schedule appointments and more. To sign up, go to [email or web address] . Click on \"Log in\" on the left side of the screen, which will take you to the Welcome page. Then click on \"Sign up Now\" on the right side of the page.     You will be asked to enter the access code listed below, as well as some personal information. Please follow the " directions to create your username and password.     Your access code is: [email or web address]  Expires: 2019  1:13 PM     Your access code will  in 90 days. If you need help or a new code, please call your [place] clinic or [phone].        Care EveryWhere ID     This is your Care EveryWhere ID. This could be used by other organizations to access your [place] medical records  [id number]         Blood Pressure from Last 3 Encounters:   10/10/18 125/82   17 106/73   17 126/76    Weight from Last 3 Encounters:   10/10/18 108.9 kg (240 lb)   17 104.8 kg (231 lb)   17 106.1 kg (234 lb)              We Performed the Following     HC PT EVAL, LOW COMPLEXITY     BILL INITIAL EVAL REPORT     BILL PT, HAND, AND CHIROPRACTIC REFERRAL     THERAPEUTIC EXERCISES        Primary Care Provider Office Phone # Fax #    Reagan Whitehead [phone]       [address] [facility] [address]        Equal Access to Services     IRVING BRASHER : Hadii aad ku hadasho Sojacali, waaxda luqadaha, qaybta kaalmada ademyranda, carlos rubio . So [facility] [phone].    ATENCIÓN: Si habla español, tiene a rhodes disposición servicios gratuitos de asistencia lingüística. Llame al [phone].    We comply with applicable federal civil rights laws and [place] laws. We do not discriminate on the basis of race, color, national origin, age, disability, sex, sexual orientation, or gender identity.            Thank you!     Thank you for choosing [facility]  for your care. Our goal is always to provide you with excellent care. Hearing back from our patients is one way we can continue to improve our services. Please take a few minutes to complete the written survey that you may receive in the mail after your visit with us. Thank you!             Your Updated Medication List - Protect others around you: Learn how to safely use, store and  throw away your medicines at www.disposemymeds.org.          This list is accurate as of 10/24/18 10:42 AM.  Always use your most recent med list.                   Brand Name Dispense Instructions for use Diagnosis    diclofenac 75 MG EC tablet    VOLTAREN    40 tablet    Take 1 tablet (75 mg) by mouth 2 times daily as needed for moderate pain    Left knee pain, unspecified chronicity

## 2018-10-24 NOTE — PROGRESS NOTES
[facility] Initial Evaluation  Subjective:  Patient is a 58 year old male presenting with rehab left knee hpi. The history is provided by the patient.   Boris Perry is a 58 year old male with a left knee condition.  Condition occurred with:  Repetition/overuse and insidious onset.  Condition occurred: for unknown reasons.  This is a chronic condition  Surgery a few years ago and has not been good since.  MD referral 10/10/2018..    Patient reports pain:  Posterior.    Pain is described as aching and is constant and reported as 8/10.  Associated symptoms:  Loss of motion/stiffness. Pain is the same all the time.  Symptoms are exacerbated by standing, walking, descending stairs, ascending stairs, kneeling and bending/squatting and relieved by analgesics.  Since onset symptoms are gradually improving (since injection).  Special testing: none recent.  Previous treatment: injection.  There was moderate improvement following previous treatment.  General health as reported by patient is good.  Pertinent medical history includes:  Numbness/tingling, osteoarthritis and overweight.  Medical allergies: no.  Other surgeries include:  Orthopedic surgery (knee scope).  Current medications:  Pain medication.  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  Patient is working in normal job without restrictions.  Primary job tasks include:  Operating a machine, prolonged standing and repetitive tasks (push/ pull).    Barriers include:  None as reported by the patient.    Red flags:  None as reported by the patient.                        Objective:  Standing Alignment:              Knee:  Genu varus L and genu varus R (lacks full ext on L )      Gait:    Gait Type:  Antalgic     Deviations:  Knee:  Knee extension decr L                                                      Knee Evaluation:  ROM:  Prom wnl knee: crepitus.  Strength:  Normal (slight pain with resistance)  AROM      Extension:   Left: 10    Right:  0  Flexion: Left: 123    Right: 130          Ligament Testing:  Normal                Special Tests:   Left knee positive for the following special tests:  Patellar Compression    Palpation:    Left knee tenderness present at:  Popliteal              General     ROS    Assessment/Plan:    Patient is a 58 year old male with left side knee complaints.    Patient has the following significant findings with corresponding treatment plan.                Diagnosis 1:  L knee pain  Pain -  self management, education, directional preference exercise and home program  Decreased ROM/flexibility - manual therapy and therapeutic exercise  Decreased strength - therapeutic exercise and therapeutic activities  Decreased function - therapeutic activities    Therapy Evaluation Codes:   1) History comprised of:   Personal factors that impact the plan of care:      None.    Comorbidity factors that impact the plan of care are:      Numbness/tingling, Osteoarthritis and Overweight.     Medications impacting care: Pain.  2) Examination of Body Systems comprised of:   Body structures and functions that impact the plan of care:      Knee.   Activity limitations that impact the plan of care are:      Squatting/kneeling, Stairs, Standing and Walking.  3) Clinical presentation characteristics are:   Stable/Uncomplicated.  4) Decision-Making    Low complexity using standardized patient assessment instrument and/or measureable assessment of functional outcome.  Cumulative Therapy Evaluation is: Low complexity.    Previous and current functional limitations:  (See Goal Flow Sheet for this information)    Short term and Long term goals: (See Goal Flow Sheet for this information)     Communication ability:  Patient appears to be able to clearly communicate and understand verbal and written communication and follow directions correctly.  Treatment Explanation - The following has been discussed with the patient:   RX ordered/plan  of care  Anticipated outcomes  Possible risks and side effects  This patient would benefit from PT intervention to resume normal activities.   Rehab potential is good.    Frequency:  1 X week, once daily  Duration:  for 6 weeks  Discharge Plan:  Achieve all LTG.  Independent in home treatment program.  Reach maximal therapeutic benefit.    Please refer to the daily flowsheet for treatment today, total treatment time and time spent performing 1:1 timed codes.

## 2018-11-06 ENCOUNTER — THERAPY VISIT (OUTPATIENT)
Dept: PHYSICAL THERAPY | Facility: CLINIC | Age: 58
End: 2018-11-06
Payer: COMMERCIAL

## 2018-11-06 DIAGNOSIS — M25.562 CHRONIC PAIN OF LEFT KNEE: Primary | ICD-10-CM

## 2018-11-06 DIAGNOSIS — G89.29 CHRONIC PAIN OF LEFT KNEE: Primary | ICD-10-CM

## 2018-11-06 PROCEDURE — 97110 THERAPEUTIC EXERCISES: CPT | Mod: GP | Performed by: PHYSICAL THERAPIST

## 2018-11-13 ENCOUNTER — THERAPY VISIT (OUTPATIENT)
Dept: PHYSICAL THERAPY | Facility: CLINIC | Age: 58
End: 2018-11-13
Payer: COMMERCIAL

## 2018-11-13 DIAGNOSIS — G89.29 CHRONIC PAIN OF LEFT KNEE: ICD-10-CM

## 2018-11-13 DIAGNOSIS — M25.562 CHRONIC PAIN OF LEFT KNEE: ICD-10-CM

## 2018-11-13 PROCEDURE — 97110 THERAPEUTIC EXERCISES: CPT | Mod: GP | Performed by: PHYSICAL THERAPIST

## 2018-11-13 ASSESSMENT — ACTIVITIES OF DAILY LIVING (ADL)
GO UP STAIRS: ACTIVITY IS NOT DIFFICULT
STIFFNESS: I DO NOT HAVE THE SYMPTOM
SWELLING: I DO NOT HAVE THE SYMPTOM
SIT WITH YOUR KNEE BENT: ACTIVITY IS NOT DIFFICULT
STAND: ACTIVITY IS NOT DIFFICULT
RISE FROM A CHAIR: ACTIVITY IS NOT DIFFICULT
WALK: ACTIVITY IS NOT DIFFICULT
GO DOWN STAIRS: ACTIVITY IS NOT DIFFICULT
KNEE_ACTIVITY_OF_DAILY_LIVING_SCORE: 100
PAIN: I DO NOT HAVE THE SYMPTOM
GIVING WAY, BUCKLING OR SHIFTING OF KNEE: I DO NOT HAVE THE SYMPTOM
KNEEL ON THE FRONT OF YOUR KNEE: ACTIVITY IS NOT DIFFICULT
RAW_SCORE: 70
SQUAT: ACTIVITY IS NOT DIFFICULT
WEAKNESS: I DO NOT HAVE THE SYMPTOM
KNEE_ACTIVITY_OF_DAILY_LIVING_SUM: 70
LIMPING: I DO NOT HAVE THE SYMPTOM

## 2018-11-13 NOTE — MR AVS SNAPSHOT
"              After Visit Summary   2018    Boris Perry    MRN: [id number]           Patient Information     Date Of Birth          1960        Visit Information        Provider Department      2018 9:50 AM Ramon Oh, PT [facility]        Today's Diagnoses     Chronic pain of left knee           Follow-ups after your visit        Who to contact     If you have questions or need follow up information about today's clinic visit or your schedule please contact [facility] directly at [phone].  Normal or non-critical lab and imaging results will be communicated to you by Y[employer], letter or phone within 4 business days after the clinic has received the results. If you do not hear from us within 7 days, please contact the clinic through Yidio[employer] or phone. If you have a critical or abnormal lab result, we will notify you by phone as soon as possible.  Submit refill requests through [employer] or call your pharmacy and they will forward the refill request to us. Please allow 3 business days for your refill to be completed.          Additional Information About Your Visit        MyChart Information     [employer] lets you send messages to your doctor, view your test results, renew your prescriptions, schedule appointments and more. To sign up, go to [email or web address] . Click on \"Log in\" on the left side of the screen, which will take you to the Welcome page. Then click on \"Sign up Now\" on the right side of the page.     You will be asked to enter the access code listed below, as well as some personal information. Please follow the directions to create your username and password.     Your access code is: [email or web address]  Expires: 2019 12:13 PM     Your access code will  in 90 days. If you need help or a new code, please call your [place] clinic or [phone].        Care EveryWhere ID     This is your Care " EveryWhere ID. This could be used by other organizations to access your [place] medical records  [id number]         Blood Pressure from Last 3 Encounters:   10/10/18 125/82   08/08/17 106/73   07/27/17 126/76    Weight from Last 3 Encounters:   10/10/18 108.9 kg (240 lb)   08/08/17 104.8 kg (231 lb)   07/27/17 106.1 kg (234 lb)              We Performed the Following     BILL PROGRESS NOTES REPORT     THERAPEUTIC EXERCISES        Primary Care Provider Office Phone # Fax #    Reagan Whitehead [phone]       [address] [facility] [address]        Equal Access to Services     DEBORAH BRASHER : Hadii sachi almaguero Soalejandro, waaxda luqadaha, qaybta kaalmada adeegyada, carlos rubio . So [facility] [phone].    ATENCIÓN: Si habla español, tiene a rhodes disposición servicios gratuitos de asistencia lingüística. Llame al [phone].    We comply with applicable federal civil rights laws and [place] laws. We do not discriminate on the basis of race, color, national origin, age, disability, sex, sexual orientation, or gender identity.            Thank you!     Thank you for choosing [facility]  for your care. Our goal is always to provide you with excellent care. Hearing back from our patients is one way we can continue to improve our services. Please take a few minutes to complete the written survey that you may receive in the mail after your visit with us. Thank you!             Your Updated Medication List - Protect others around you: Learn how to safely use, store and throw away your medicines at www.disposemymeds.org.          This list is accurate as of 11/13/18 10:19 AM.  Always use your most recent med list.                   Brand Name Dispense Instructions for use Diagnosis    diclofenac 75 MG EC tablet    VOLTAREN    40 tablet    Take 1 tablet (75 mg) by mouth 2 times daily as needed for moderate pain    Left knee  pain, unspecified chronicity

## 2018-11-13 NOTE — PROGRESS NOTES
Subjective:  HPI       Knee Activity of Daily Living Score: 100            Objective:  System    Physical Exam    General     ROS    Assessment/Plan:    DISCHARGE REPORT    Progress reporting period is from 10/24/2018 to 11/13/2018.       SUBJECTIVE  Subjective changes noted by patient:  No pain lately.    Current pain level is : 0/10.     Previous pain level was  : 8/10.   Changes in function:  Yes (See Goal flowsheet attached for changes in current functional level)  Adverse reaction to treatment or activity: None    OBJECTIVE  Objective: No pain noted with exercises   Extension improved    ASSESSMENT/PLAN  Updated problem list and treatment plan: Diagnosis 1:  L knee pain  Pain -  self management, education, directional preference exercise and home program  Decreased ROM/flexibility - manual therapy and therapeutic exercise  Decreased strength - therapeutic exercise and therapeutic activities  Decreased function - therapeutic activities     STG/LTGs have been met or progress has been made towards goals:  Yes (See Goal flow sheet completed today.)  Assessment of Progress: The patient's condition is improving.  The patient has met all of their long term goals.  Self Management Plans:  Patient has been instructed in a home treatment program.    Boris continues to require the following intervention to meet STG and LTG's:  PT intervention is no longer required to meet STG/LTG.    Recommendations:  This patient is ready to be discharged from therapy and continue their home treatment program.    Please refer to the daily flowsheet for treatment today, total treatment time and time spent performing 1:1 timed codes.

## 2021-03-26 ENCOUNTER — IMMUNIZATION (OUTPATIENT)
Dept: NURSING | Facility: CLINIC | Age: 61
End: 2021-03-26
Payer: COMMERCIAL

## 2021-03-26 PROCEDURE — 0001A PR COVID VAC PFIZER DIL RECON 30 MCG/0.3 ML IM: CPT

## 2021-03-26 PROCEDURE — 91300 PR COVID VAC PFIZER DIL RECON 30 MCG/0.3 ML IM: CPT

## 2021-04-02 ENCOUNTER — E-VISIT (OUTPATIENT)
Dept: URGENT CARE | Facility: CLINIC | Age: 61
End: 2021-04-02
Payer: COMMERCIAL

## 2021-04-02 DIAGNOSIS — L30.9 DERMATITIS: Primary | ICD-10-CM

## 2021-04-02 PROCEDURE — 99421 OL DIG E/M SVC 5-10 MIN: CPT | Performed by: FAMILY MEDICINE

## 2021-04-02 RX ORDER — TRIAMCINOLONE ACETONIDE 0.25 MG/G
OINTMENT TOPICAL 2 TIMES DAILY
Qty: 454 G | Refills: 1 | Status: SHIPPED | OUTPATIENT
Start: 2021-04-02

## 2021-04-11 ENCOUNTER — HEALTH MAINTENANCE LETTER (OUTPATIENT)
Age: 61
End: 2021-04-11

## 2021-04-16 ENCOUNTER — IMMUNIZATION (OUTPATIENT)
Dept: NURSING | Facility: CLINIC | Age: 61
End: 2021-04-16
Attending: FAMILY MEDICINE
Payer: COMMERCIAL

## 2021-04-16 PROCEDURE — 91300 PR COVID VAC PFIZER DIL RECON 30 MCG/0.3 ML IM: CPT

## 2021-04-16 PROCEDURE — 0002A PR COVID VAC PFIZER DIL RECON 30 MCG/0.3 ML IM: CPT

## 2021-09-26 ENCOUNTER — HEALTH MAINTENANCE LETTER (OUTPATIENT)
Age: 61
End: 2021-09-26

## 2022-05-08 ENCOUNTER — HEALTH MAINTENANCE LETTER (OUTPATIENT)
Age: 62
End: 2022-05-08

## 2022-08-19 NOTE — PATIENT INSTRUCTIONS
Dear Boris Perry    After reviewing your responses and photo, I think it is most likely this is something called 'dermatitis' which causes itchy bumps to appear on your skin. The exact cause is unknown but your risk may increase if you have allergies, smoke, or have other skin conditions.     I have prescribed a steroid cream to treat this. Please follow the instructions on the medication. If you experience irritation of your skin, new rash, or any other new symptoms (if you have fever, feel sick or if it continues to spread), you should stop using this medication and come in to be seen in one of our urgent cares or your primary care clinic.    If this treatment does not work for you or you, please plan to follow- up with your primary care provider to set refills for a longer period of time or to try other options.     Things you can do to help prevent this:     Do not scratch your rash.Bacteria from your fingernails may enter your open sores during scratching and cause an infection.     Use moisturizes or emollients, such as petroleum jelly.These help relieve itching and help prevent bacteria from getting in your sores. First apply the steroid cream, then apply the moisturizer or emollient on top.    Thanks for choosing us as your health care partner,    Yvrose Mendiola MD   none

## 2023-01-08 ENCOUNTER — HEALTH MAINTENANCE LETTER (OUTPATIENT)
Age: 63
End: 2023-01-08

## 2023-06-02 ENCOUNTER — HEALTH MAINTENANCE LETTER (OUTPATIENT)
Age: 63
End: 2023-06-02

## 2024-05-29 ENCOUNTER — TRANSFERRED RECORDS (OUTPATIENT)
Dept: HEALTH INFORMATION MANAGEMENT | Facility: CLINIC | Age: 64
End: 2024-05-29
Payer: COMMERCIAL

## 2024-08-30 ENCOUNTER — OFFICE VISIT (OUTPATIENT)
Dept: FAMILY MEDICINE | Facility: CLINIC | Age: 64
End: 2024-08-30
Payer: COMMERCIAL

## 2024-08-30 VITALS
DIASTOLIC BLOOD PRESSURE: 86 MMHG | HEIGHT: 72 IN | RESPIRATION RATE: 16 BRPM | WEIGHT: 235 LBS | HEART RATE: 51 BPM | TEMPERATURE: 98.1 F | SYSTOLIC BLOOD PRESSURE: 138 MMHG | OXYGEN SATURATION: 96 % | BODY MASS INDEX: 31.83 KG/M2

## 2024-08-30 DIAGNOSIS — Z12.5 SCREENING FOR PROSTATE CANCER: ICD-10-CM

## 2024-08-30 DIAGNOSIS — Z83.719 FH: COLON POLYPS: ICD-10-CM

## 2024-08-30 DIAGNOSIS — Z12.11 SCREEN FOR COLON CANCER: ICD-10-CM

## 2024-08-30 DIAGNOSIS — M17.11 PRIMARY OSTEOARTHRITIS OF RIGHT KNEE: ICD-10-CM

## 2024-08-30 DIAGNOSIS — Z11.4 SCREENING FOR HIV (HUMAN IMMUNODEFICIENCY VIRUS): ICD-10-CM

## 2024-08-30 DIAGNOSIS — E78.5 HYPERLIPIDEMIA WITH TARGET LDL LESS THAN 130: ICD-10-CM

## 2024-08-30 DIAGNOSIS — E66.811 OBESITY (BMI 30.0-34.9): ICD-10-CM

## 2024-08-30 DIAGNOSIS — Z01.818 PREOP GENERAL PHYSICAL EXAM: Primary | ICD-10-CM

## 2024-08-30 LAB
ANION GAP SERPL CALCULATED.3IONS-SCNC: 11 MMOL/L (ref 7–15)
BUN SERPL-MCNC: 20.4 MG/DL (ref 8–23)
CALCIUM SERPL-MCNC: 9.1 MG/DL (ref 8.8–10.4)
CHLORIDE SERPL-SCNC: 105 MMOL/L (ref 98–107)
CHOLEST SERPL-MCNC: 222 MG/DL
CREAT SERPL-MCNC: 0.88 MG/DL (ref 0.67–1.17)
EGFRCR SERPLBLD CKD-EPI 2021: >90 ML/MIN/1.73M2
ERYTHROCYTE [DISTWIDTH] IN BLOOD BY AUTOMATED COUNT: 12.5 % (ref 10–15)
FASTING STATUS PATIENT QL REPORTED: YES
FASTING STATUS PATIENT QL REPORTED: YES
GLUCOSE SERPL-MCNC: 96 MG/DL (ref 70–99)
HCO3 SERPL-SCNC: 24 MMOL/L (ref 22–29)
HCT VFR BLD AUTO: 47.5 % (ref 40–53)
HDLC SERPL-MCNC: 41 MG/DL
HGB BLD-MCNC: 15.8 G/DL (ref 13.3–17.7)
HIV 1+2 AB+HIV1 P24 AG SERPL QL IA: NONREACTIVE
LDLC SERPL CALC-MCNC: 140 MG/DL
MCH RBC QN AUTO: 29.6 PG (ref 26.5–33)
MCHC RBC AUTO-ENTMCNC: 33.3 G/DL (ref 31.5–36.5)
MCV RBC AUTO: 89 FL (ref 78–100)
NONHDLC SERPL-MCNC: 181 MG/DL
PLATELET # BLD AUTO: 245 10E3/UL (ref 150–450)
POTASSIUM SERPL-SCNC: 4.3 MMOL/L (ref 3.4–5.3)
PSA SERPL DL<=0.01 NG/ML-MCNC: 0.78 NG/ML (ref 0–4.5)
RBC # BLD AUTO: 5.33 10E6/UL (ref 4.4–5.9)
SODIUM SERPL-SCNC: 140 MMOL/L (ref 135–145)
TRIGL SERPL-MCNC: 203 MG/DL
WBC # BLD AUTO: 6.3 10E3/UL (ref 4–11)

## 2024-08-30 PROCEDURE — 80048 BASIC METABOLIC PNL TOTAL CA: CPT | Performed by: FAMILY MEDICINE

## 2024-08-30 PROCEDURE — 36415 COLL VENOUS BLD VENIPUNCTURE: CPT | Performed by: FAMILY MEDICINE

## 2024-08-30 PROCEDURE — G2211 COMPLEX E/M VISIT ADD ON: HCPCS | Performed by: FAMILY MEDICINE

## 2024-08-30 PROCEDURE — G0103 PSA SCREENING: HCPCS | Performed by: FAMILY MEDICINE

## 2024-08-30 PROCEDURE — 85027 COMPLETE CBC AUTOMATED: CPT | Performed by: FAMILY MEDICINE

## 2024-08-30 PROCEDURE — 87389 HIV-1 AG W/HIV-1&-2 AB AG IA: CPT | Performed by: FAMILY MEDICINE

## 2024-08-30 PROCEDURE — 99204 OFFICE O/P NEW MOD 45 MIN: CPT | Performed by: FAMILY MEDICINE

## 2024-08-30 PROCEDURE — 80061 LIPID PANEL: CPT | Performed by: FAMILY MEDICINE

## 2024-08-30 ASSESSMENT — PAIN SCALES - GENERAL: PAINLEVEL: SEVERE PAIN (7)

## 2024-08-30 NOTE — PROGRESS NOTES
[facility] Evaluation  [facility]  [address] [place] [address]  [place] [address]  Phone: [phone]  Primary Provider: Reagan Whitehead MD  Pre-op Performing Provider: Reagan Whitehead MD  Aug 30, 2024             8/30/2024   Surgical Information   What procedure is being done? right TKA   Facility or Hospital where procedure/surgery will be performed: [facility]   Who is doing the procedure / surgery? dr alexx hernandez   Date of surgery / procedure: 9 20 2024   Time of surgery / procedure: afternoon   Where do you plan to recover after surgery? at a TCU (Transitional Care Unit)        Fax number for surgical facility: [phone]    Assessment & Plan     The proposed surgical procedure is considered INTERMEDIATE risk.      ICD-10-CM    1. Preop general physical exam  Z01.818 Basic metabolic panel     CBC with platelets      2. Primary osteoarthritis of right knee  M17.11       3. Obesity (BMI 30.0-34.9)  E66.9       4. Hyperlipidemia with target LDL less than 130  E78.5 Lipid panel reflex to direct LDL Fasting      5. FH: colon polyps  Z83.719 Colonoscopy Screening  Referral      6. Screen for colon cancer  Z12.11 Prostate Specific Antigen Screen     Colonoscopy Screening  Referral      7. Screening for prostate cancer  Z12.5       8. Screening for HIV (human immunodeficiency virus)  Z11.4 HIV Antigen Antibody Combo Cascade        It has been many years since he has had a routine physical, so we will check some cancer screening tests and other routine labs as above.       - No identified additional risk factors other than previously addressed    Antiplatelet or Anticoagulation Medication Instructions   - Patient is on no antiplatelet or anticoagulation medications.    Additional Medication Instructions  Patient is on no additional chronic medications    Recommendation  Approval given to proceed with proposed procedure, without further diagnostic  evaluation.    Tomasz Escalante is a 63 year old, presenting for the following:  Pre-Op Exam          8/30/2024     8:57 AM   Additional Questions   Roomed by cam   Accompanied by none         8/30/2024   Forms   Any forms needing to be completed Yes          8/30/2024     8:57 AM   Patient Reported Additional Medications   Patient reports taking the following new medications none     HPI related to upcoming procedure: 63-year-old with a history of previous knee arthroscopies and known bilateral knee arthritis, right worse than left, is coming in now for a right total knee replacement.        8/30/2024   Pre-Op Questionnaire   Have you ever had a heart attack or stroke? No   Have you ever had surgery on your heart or blood vessels, such as a stent placement, a coronary artery bypass, or surgery on an artery in your head, neck, heart, or legs? No   Do you have chest pain with activity? No   Do you have a history of heart failure? No   Do you currently have a cold, bronchitis or symptoms of other infection? No   Do you have a cough, shortness of breath, or wheezing? No   Do you or anyone in your family have previous history of blood clots? No   Do you or does anyone in your family have a serious bleeding problem such as prolonged bleeding following surgeries or cuts? No   Have you ever had problems with anemia or been told to take iron pills? No   Have you had any abnormal blood loss such as black, tarry or bloody stools? No   Have you ever had a blood transfusion? No   Are you willing to have a blood transfusion if it is medically needed before, during, or after your surgery? Yes   Have you or any of your relatives ever had problems with anesthesia? No   Do you have sleep apnea, excessive snoring or daytime drowsiness? No   Do you have any artifical heart valves or other implanted medical devices like a pacemaker, defibrillator, or continuous glucose monitor? No   Do you have artificial joints? No   Are you  allergic to latex? No        Health Care Directive  Patient does not have a Health Care Directive or Living Will    Preoperative Review of    reviewed - no record of controlled substances prescribed.      Status of Chronic Conditions:  See problem list for active medical problems.  Problems all longstanding and stable, except as noted/documented.  See ROS for pertinent symptoms related to these conditions.    Patient Active Problem List    Diagnosis Date Noted    FH: colon polyps      Priority: Medium    Obesity (BMI 30.0-34.9) 08/24/2016     Priority: Medium    S/P arthroscopy of left knee 03/05/2015     Priority: Medium    Hyperlipidemia with target LDL less than 130 02/18/2015     Priority: Medium     Diagnosis updated by automated process. Provider to review and confirm.      S/P arthroscopy of right knee 09/16/2010     Priority: Medium    MEDIAL MENISCUS TEAR - left 07/20/2010     Priority: Medium    CHONDROMALACIA, KNEE - bilateral 07/20/2010     Priority: Medium      Past Medical History:   Diagnosis Date    Carpal tunnel syndrome     Bilateral    Hyperlipidemia LDL goal < 130     Medial meniscus tear     right     Past Surgical History:   Procedure Laterality Date    ARTHROSCOPY KNEE Left 10/01/2008    Dr. Correa    ARTHROSCOPY KNEE Right     Dr. Correa    ARTHROSCOPY KNEE Left 2/27/2015    Procedure: ARTHROSCOPY KNEE;  Surgeon: Reagan Youssef MD;  Location: [facility] OR    COLONOSCOPY  3-10-15    Repeat Colonoscopy in 5  yrs.    HC KNEE SCOPE,MED/LAT MENISECTOMY Right 09/10/2010    medial mensicus -- Dr. Youssef    [facility] NONSPECIFIC PROCEDURE  1999    RT Middle finger tendon repair     Current Outpatient Medications   Medication Sig Dispense Refill    triamcinolone (KENALOG) 0.025 % external ointment Apply topically 2 times daily 454 g 1       No Known Allergies     Social History     Tobacco Use    Smoking status: Never     Passive exposure: Never    Smokeless tobacco: Never   Substance Use Topics     "Alcohol use: No     No family history on file.  History   Drug Use No             Review of Systems  Constitutional, HEENT, cardiovascular, pulmonary, gi and gu systems are negative, except as otherwise noted.    Objective    /86 (BP Location: Left arm, Patient Position: Sitting, Cuff Size: Adult Large)   Pulse 51   Temp 98.1  F (36.7  C) (Temporal)   Resp 16   Ht 1.829 m (6')   Wt 106.6 kg (235 lb)   SpO2 96%   BMI 31.87 kg/m     Estimated body mass index is 31.87 kg/m  as calculated from the following:    Height as of this encounter: 1.829 m (6').    Weight as of this encounter: 106.6 kg (235 lb).  Physical Exam  GENERAL: alert and no distress  EYES: Eyes grossly normal to inspection, PERRL and conjunctivae and sclerae normal  HENT: ear canals and TM's normal, nose and mouth without ulcers or lesions  NECK: no adenopathy, no asymmetry, masses, or scars  RESP: lungs clear to auscultation - no rales, rhonchi or wheezes  CV: regular rate and rhythm, normal S1 S2, no S3 or S4, no murmur, click or rub, no peripheral edema  ABDOMEN: soft, nontender, no hepatosplenomegaly, no masses   MS: He has some decreased range of motion of the knees with some knee discomfort with range of motion.  No warmth or erythema of the knees.  SKIN: no suspicious lesions or rashes  NEURO: Normal strength and tone, mentation intact and speech normal  PSYCH: mentation appears normal, affect normal/bright    No results for input(s): \"HGB\", \"PLT\", \"INR\", \"NA\", \"POTASSIUM\", \"CR\", \"A1C\" in the last 8760 hours.     Diagnostics  LABS:   Office Visit on 08/30/2024   Component Date Value Ref Range Status    Sodium 08/30/2024 140  135 - 145 mmol/L Final    Potassium 08/30/2024 4.3  3.4 - 5.3 mmol/L Final    Chloride 08/30/2024 105  98 - 107 mmol/L Final    Carbon Dioxide (CO2) 08/30/2024 24  22 - 29 mmol/L Final    Anion Gap 08/30/2024 11  7 - 15 mmol/L Final    Urea Nitrogen 08/30/2024 20.4  8.0 - 23.0 mg/dL Final    Creatinine 08/30/2024 " 0.88  0.67 - 1.17 mg/dL Final    GFR Estimate 08/30/2024 >90  >60 mL/min/1.73m2 Final    eGFR calculated using 2021 CKD-EPI equation.    Calcium 08/30/2024 9.1  8.8 - 10.4 mg/dL Final    Reference intervals for this test were updated on 7/16/2024 to reflect our healthy population more accurately. There may be differences in the flagging of prior results with similar values performed with this method. Those prior results can be interpreted in the context of the updated reference intervals.    Glucose 08/30/2024 96  70 - 99 mg/dL Final    Patient Fasting > 8hrs? 08/30/2024 Yes   Final    Cholesterol 08/30/2024 222 (H)  <200 mg/dL Final    Triglycerides 08/30/2024 203 (H)  <150 mg/dL Final    Direct Measure HDL 08/30/2024 41  >=40 mg/dL Final    LDL Cholesterol Calculated 08/30/2024 140 (H)  <=100 mg/dL Final    Non HDL Cholesterol 08/30/2024 181 (H)  <130 mg/dL Final    Patient Fasting > 8hrs? 08/30/2024 Yes   Final    WBC Count 08/30/2024 6.3  4.0 - 11.0 10e3/uL Final    RBC Count 08/30/2024 5.33  4.40 - 5.90 10e6/uL Final    Hemoglobin 08/30/2024 15.8  13.3 - 17.7 g/dL Final    Hematocrit 08/30/2024 47.5  40.0 - 53.0 % Final    MCV 08/30/2024 89  78 - 100 fL Final    MCH 08/30/2024 29.6  26.5 - 33.0 pg Final    MCHC 08/30/2024 33.3  31.5 - 36.5 g/dL Final    RDW 08/30/2024 12.5  10.0 - 15.0 % Final    Platelet Count 08/30/2024 245  150 - 450 10e3/uL Final    Prostate Specific Antigen Screen 08/30/2024 0.78  0.00 - 4.50 ng/mL Final    HIV Antigen Antibody Combo 08/30/2024 Nonreactive  Nonreactive Final    Negative HIV-1 p24 antigen and HIV-1/2 antibody screening test results usually indicate the absence of HIV-1 and HIV-2 infection. However, such negative results do not rule-out acute HIV infection.  If acute HIV-1 or HIV-2 infection is suspected, detection of HIV-1 or HIV-2 RNA  is recommended.       No EKG required, no history of coronary heart disease, significant arrhythmia, peripheral arterial disease or other  structural heart disease.    Revised Cardiac Risk Index (RCRI)  The patient has the following serious cardiovascular risks for perioperative complications:   - No serious cardiac risks = 0 points     RCRI Interpretation: 0 points: Class I (very low risk - 0.4% complication rate)         Signed Electronically by: Reagan Whitehead MD  A copy of this evaluation report is provided to the requesting physician.

## 2024-09-03 NOTE — RESULT ENCOUNTER NOTE
Boris,  Your cholesterol values are still a bit high, but the rest of your labs look nice and healthy and in good shape for your planned knee replacement surgery including your electrolytes, kidney tests, blood sugar, and blood counts.  Your PSA prostate cancer screening test and HIV screening test are both normal as well.  Hope the knee surgery goes well for you!    Reagan Whtiehead MD

## 2024-09-20 ENCOUNTER — TRANSFERRED RECORDS (OUTPATIENT)
Dept: HEALTH INFORMATION MANAGEMENT | Facility: CLINIC | Age: 64
End: 2024-09-20
Payer: COMMERCIAL

## 2024-10-02 ENCOUNTER — TRANSFERRED RECORDS (OUTPATIENT)
Dept: HEALTH INFORMATION MANAGEMENT | Facility: CLINIC | Age: 64
End: 2024-10-02
Payer: COMMERCIAL

## 2024-11-16 ENCOUNTER — HEALTH MAINTENANCE LETTER (OUTPATIENT)
Age: 64
End: 2024-11-16

## 2025-01-20 ENCOUNTER — OFFICE VISIT (OUTPATIENT)
Dept: FAMILY MEDICINE | Facility: CLINIC | Age: 65
End: 2025-01-20
Payer: COMMERCIAL

## 2025-01-20 VITALS
HEIGHT: 72 IN | BODY MASS INDEX: 31.64 KG/M2 | RESPIRATION RATE: 12 BRPM | HEART RATE: 67 BPM | OXYGEN SATURATION: 97 % | SYSTOLIC BLOOD PRESSURE: 116 MMHG | TEMPERATURE: 97.4 F | WEIGHT: 233.6 LBS | DIASTOLIC BLOOD PRESSURE: 73 MMHG

## 2025-01-20 DIAGNOSIS — Z23 NEED FOR PNEUMOCOCCAL VACCINE: ICD-10-CM

## 2025-01-20 DIAGNOSIS — Z96.651 STATUS POST TOTAL RIGHT KNEE REPLACEMENT: ICD-10-CM

## 2025-01-20 DIAGNOSIS — Z23 NEED FOR INFLUENZA VACCINATION: ICD-10-CM

## 2025-01-20 DIAGNOSIS — Z01.818 PREOP GENERAL PHYSICAL EXAM: Primary | ICD-10-CM

## 2025-01-20 DIAGNOSIS — M17.12 PRIMARY OSTEOARTHRITIS OF LEFT KNEE: ICD-10-CM

## 2025-01-20 DIAGNOSIS — E78.5 HYPERLIPIDEMIA WITH TARGET LDL LESS THAN 130: ICD-10-CM

## 2025-01-20 LAB
ANION GAP SERPL CALCULATED.3IONS-SCNC: 11 MMOL/L (ref 7–15)
BUN SERPL-MCNC: 26.7 MG/DL (ref 8–23)
CALCIUM SERPL-MCNC: 10.4 MG/DL (ref 8.8–10.4)
CHLORIDE SERPL-SCNC: 101 MMOL/L (ref 98–107)
CHOLEST SERPL-MCNC: 214 MG/DL
CREAT SERPL-MCNC: 1.1 MG/DL (ref 0.67–1.17)
EGFRCR SERPLBLD CKD-EPI 2021: 74 ML/MIN/1.73M2
ERYTHROCYTE [DISTWIDTH] IN BLOOD BY AUTOMATED COUNT: 12.7 % (ref 10–15)
FASTING STATUS PATIENT QL REPORTED: YES
FASTING STATUS PATIENT QL REPORTED: YES
GLUCOSE SERPL-MCNC: 98 MG/DL (ref 70–99)
HCO3 SERPL-SCNC: 27 MMOL/L (ref 22–29)
HCT VFR BLD AUTO: 49.6 % (ref 40–53)
HDLC SERPL-MCNC: 41 MG/DL
HGB BLD-MCNC: 16.6 G/DL (ref 13.3–17.7)
LDLC SERPL CALC-MCNC: 137 MG/DL
MCH RBC QN AUTO: 28.9 PG (ref 26.5–33)
MCHC RBC AUTO-ENTMCNC: 33.5 G/DL (ref 31.5–36.5)
MCV RBC AUTO: 86 FL (ref 78–100)
NONHDLC SERPL-MCNC: 173 MG/DL
PLATELET # BLD AUTO: 293 10E3/UL (ref 150–450)
POTASSIUM SERPL-SCNC: 4.9 MMOL/L (ref 3.4–5.3)
RBC # BLD AUTO: 5.74 10E6/UL (ref 4.4–5.9)
SODIUM SERPL-SCNC: 139 MMOL/L (ref 135–145)
TRIGL SERPL-MCNC: 180 MG/DL
WBC # BLD AUTO: 7 10E3/UL (ref 4–11)

## 2025-01-20 PROCEDURE — 80048 BASIC METABOLIC PNL TOTAL CA: CPT | Performed by: FAMILY MEDICINE

## 2025-01-20 PROCEDURE — 90662 IIV NO PRSV INCREASED AG IM: CPT | Performed by: FAMILY MEDICINE

## 2025-01-20 PROCEDURE — 90677 PCV20 VACCINE IM: CPT | Performed by: FAMILY MEDICINE

## 2025-01-20 PROCEDURE — 85027 COMPLETE CBC AUTOMATED: CPT | Performed by: FAMILY MEDICINE

## 2025-01-20 PROCEDURE — 99214 OFFICE O/P EST MOD 30 MIN: CPT | Mod: 25 | Performed by: FAMILY MEDICINE

## 2025-01-20 PROCEDURE — 90471 IMMUNIZATION ADMIN: CPT | Performed by: FAMILY MEDICINE

## 2025-01-20 PROCEDURE — 90472 IMMUNIZATION ADMIN EACH ADD: CPT | Performed by: FAMILY MEDICINE

## 2025-01-20 PROCEDURE — 80061 LIPID PANEL: CPT | Performed by: FAMILY MEDICINE

## 2025-01-20 PROCEDURE — 36415 COLL VENOUS BLD VENIPUNCTURE: CPT | Performed by: FAMILY MEDICINE

## 2025-01-20 NOTE — RESULT ENCOUNTER NOTE
Boris,  These lab results generally look good and/or stable from the past, with your lipid values being a bit better than last time, so that is good.  We can discuss some further next Monday when you return for your annual wellness exam.    Reagan Whitehead MD

## 2025-01-20 NOTE — PROGRESS NOTES
Preoperative Evaluation  [facility] [place]  [address]  [place] [address]  Phone: [phone]  Primary Provider: Reagan Whitehead MD  Pre-op Performing Provider: Reagan Whitehead MD  Jan 20, 2025 1/17/2025   Surgical Information   What procedure is being done? Left knee total replacement   Facility or Hospital where procedure[facility]surgery [facility] be performed[facility]   Who is doing the procedure / surgery? SONAM Mays   Date of surgery / procedure: 2/17/2025   Time of surgery / procedure: NA   Where do you plan to recover after surgery? at home with family     Fax number for surgical facility: [phone]    Assessment & Plan     The proposed surgical procedure is considered INTERMEDIATE risk.      ICD-10-CM    1. Preop general physical exam  Z01.818 Basic metabolic panel     CBC with platelets     Basic metabolic panel     CBC with platelets      2. Primary osteoarthritis of left knee  M17.12       3. Status post total right knee replacement  Z96.651       4. Hyperlipidemia with target LDL less than 130  E78.5 Lipid panel reflex to direct LDL Fasting     Lipid panel reflex to direct LDL Fasting      5. Need for influenza vaccination  Z23 INFLUENZA HIGH DOSE, TRIVALENT, PF (FLUZONE)      6. Need for pneumococcal vaccine  Z23 Pneumococcal 20 Valent Conjugate (PCV20)        We will give him a flu shot and Prevnar 20 vaccine today.  He declines a COVID-vaccine.  We will plan to give him a Shingrix vaccine next week when he returns for an annual wellness exam.     - No identified additional risk factors other than previously addressed    Antiplatelet or Anticoagulation Medication Instructions   - Patient is on no antiplatelet or anticoagulation medications.    Additional Medication Instructions  Patient is on no additional chronic medications    Recommendation  Approval given to proceed with proposed procedure, without further diagnostic evaluation.    Tomasz Escalante is a  64 year old, presenting for the following:  Pre-Op Exam          1/20/2025     9:08 AM   Additional Questions   Roomed by An V.         1/20/2025     9:08 AM   Patient Reported Additional Medications   Patient reports taking the following new medications none     HPI related to upcoming procedure: 64-year-old white male with a history of bilateral knee osteoarthritis had his right knee replaced back in September and has done well with that.  He is coming in now for a left total knee replacement.        1/17/2025   Pre-Op Questionnaire   Have you ever had a heart attack or stroke? No   Have you ever had surgery on your heart or blood vessels, such as a stent placement, a coronary artery bypass, or surgery on an artery in your head, neck, heart, or legs? No   Do you have chest pain with activity? No   Do you have a history of heart failure? No   Do you currently have a cold, bronchitis or symptoms of other infection? No   Do you have a cough, shortness of breath, or wheezing? No   Do you or anyone in your family have previous history of blood clots? No   Do you or does anyone in your family have a serious bleeding problem such as prolonged bleeding following surgeries or cuts? No   Have you ever had problems with anemia or been told to take iron pills? No   Have you had any abnormal blood loss such as black, tarry or bloody stools? No   Have you ever had a blood transfusion? No   Are you willing to have a blood transfusion if it is medically needed before, during, or after your surgery? Yes   Have you or any of your relatives ever had problems with anesthesia? (!) YES    Do you have sleep apnea, excessive snoring or daytime drowsiness? No   Do you have any artifical heart valves or other implanted medical devices like a pacemaker, defibrillator, or continuous glucose monitor? No   Do you have artificial joints? (!) YES   Are you allergic to latex? No     Health Care Directive  Patient does not have a Health Care  Directive        Status of Chronic Conditions:  See problem list for active medical problems.  Problems all longstanding and stable, except as noted/documented.  See ROS for pertinent symptoms related to these conditions.    Patient Active Problem List    Diagnosis Date Noted    FH: colon polyps      Priority: Medium    Obesity (BMI 30.0-34.9) 08/24/2016     Priority: Medium    S/P arthroscopy of left knee 03/05/2015     Priority: Medium    Hyperlipidemia with target LDL less than 130 02/18/2015     Priority: Medium     Diagnosis updated by automated process. Provider to review and confirm.      S/P arthroscopy of right knee 09/16/2010     Priority: Medium    MEDIAL MENISCUS TEAR - left 07/20/2010     Priority: Medium    CHONDROMALACIA, KNEE - bilateral 07/20/2010     Priority: Medium      Past Medical History:   Diagnosis Date    Arthritis na    Knees    Carpal tunnel syndrome     Bilateral    Hyperlipidemia LDL goal < 130     Medial meniscus tear     right     Past Surgical History:   Procedure Laterality Date    ARTHROSCOPY KNEE Left 10/01/2008    Dr. Correa    ARTHROSCOPY KNEE Right     Dr. Correa    ARTHROSCOPY KNEE Left 2/27/2015    Procedure: ARTHROSCOPY KNEE;  Surgeon: Reagan Youssef MD;  Location: [facility]    COLONOSCOPY  3-10-15    Repeat Colonoscopy in 5  yrs.    HC KNEE SCOPE,MED/LAT MENISECTOMY Right 09/10/2010    medial mensicus -- Dr. Youssef    Z NONSPECIFIC PROCEDURE  1999    RT Middle finger tendon repair     Current Outpatient Medications   Medication Sig Dispense Refill    triamcinolone (KENALOG) 0.025 % external ointment Apply topically 2 times daily 454 g 1       No Known Allergies     Social History     Tobacco Use    Smoking status: Never     Passive exposure: Never    Smokeless tobacco: Never   Substance Use Topics    Alcohol use: No     Family History   Problem Relation Age of Onset    Hypertension Mother     Hyperlipidemia Mother     Anxiety Disorder Mother     Osteoporosis Mother   "   Thyroid Disease Mother     Breast Cancer Maternal Grandmother     Hyperlipidemia Sister     Other Cancer Brother         Thyroid    Thyroid Disease Brother     Other Cancer Brother         Throat and bladder    Anesthesia Reaction Maternal Half-Brother         Has a hard time when coming out of    Asthma Sister      History   Drug Use No             Review of Systems  Constitutional, HEENT, cardiovascular, pulmonary, gi and gu systems are negative, except as otherwise noted.    Objective    /73   Pulse 67   Temp 97.4  F (36.3  C) (Temporal)   Resp 12   Ht 1.829 m (6' 0.01\")   Wt 106 kg (233 lb 9.6 oz)   SpO2 97%   BMI 31.67 kg/m     Estimated body mass index is 31.67 kg/m  as calculated from the following:    Height as of this encounter: 1.829 m (6' 0.01\").    Weight as of this encounter: 106 kg (233 lb 9.6 oz).  Physical Exam  GENERAL: alert and no distress  EYES: Eyes grossly normal to inspection, PERRL and conjunctivae and sclerae normal  HENT: Grossly normal  NECK: no adenopathy, no asymmetry, masses, or scars  RESP: lungs clear to auscultation - no rales, rhonchi or wheezes  CV: regular rate and rhythm, normal S1 S2, no S3 or S4, no murmur, click or rub, no peripheral edema  ABDOMEN: soft, nontender, no hepatosplenomegaly, no masses  MS: Right knee has a well-healed surgical scar in the right lower extremity looks nice and straight.  His left knee has a varus deformity.  SKIN: no suspicious lesions or rashes  NEURO: Normal strength and tone, mentation intact and speech normal  PSYCH: mentation appears normal, affect normal/bright    Recent Labs   Lab Test 08/30/24  0939   HGB 15.8         POTASSIUM 4.3   CR 0.88        Diagnostics  LABS:   Office Visit on 01/20/2025   Component Date Value Ref Range Status    Sodium 01/20/2025 139  135 - 145 mmol/L Final    Potassium 01/20/2025 4.9  3.4 - 5.3 mmol/L Final    Chloride 01/20/2025 101  98 - 107 mmol/L Final    Carbon Dioxide (CO2) " 01/20/2025 27  22 - 29 mmol/L Final    Anion Gap 01/20/2025 11  7 - 15 mmol/L Final    Urea Nitrogen 01/20/2025 26.7 (H)  8.0 - 23.0 mg/dL Final    Creatinine 01/20/2025 1.10  0.67 - 1.17 mg/dL Final    GFR Estimate 01/20/2025 74  >60 mL/min/1.73m2 Final    eGFR calculated using 2021 CKD-EPI equation.    Calcium 01/20/2025 10.4  8.8 - 10.4 mg/dL Final    Reference intervals for this test were updated on 7/16/2024 to reflect our healthy population more accurately. There may be differences in the flagging of prior results with similar values performed with this method. Those prior results can be interpreted in the context of the updated reference intervals.    Glucose 01/20/2025 98  70 - 99 mg/dL Final    Patient Fasting > 8hrs? 01/20/2025 Yes   Final    Cholesterol 01/20/2025 214 (H)  <200 mg/dL Final    Triglycerides 01/20/2025 180 (H)  <150 mg/dL Final    Direct Measure HDL 01/20/2025 41  >=40 mg/dL Final    LDL Cholesterol Calculated 01/20/2025 137 (H)  <100 mg/dL Final    Non HDL Cholesterol 01/20/2025 173 (H)  <130 mg/dL Final    Patient Fasting > 8hrs? 01/20/2025 Yes   Final    WBC Count 01/20/2025 7.0  4.0 - 11.0 10e3/uL Final    RBC Count 01/20/2025 5.74  4.40 - 5.90 10e6/uL Final    Hemoglobin 01/20/2025 16.6  13.3 - 17.7 g/dL Final    Hematocrit 01/20/2025 49.6  40.0 - 53.0 % Final    MCV 01/20/2025 86  78 - 100 fL Final    MCH 01/20/2025 28.9  26.5 - 33.0 pg Final    MCHC 01/20/2025 33.5  31.5 - 36.5 g/dL Final    RDW 01/20/2025 12.7  10.0 - 15.0 % Final    Platelet Count 01/20/2025 293  150 - 450 10e3/uL Final      No EKG required, no history of coronary heart disease, significant arrhythmia, peripheral arterial disease or other structural heart disease.    Revised Cardiac Risk Index (RCRI)  The patient has the following serious cardiovascular risks for perioperative complications:   - No serious cardiac risks = 0 points     RCRI Interpretation: 0 points: Class I (very low risk - 0.4% complication  rate)         Signed Electronically by: Reagan Whitehead MD  A copy of this evaluation report is provided to the requesting physician.

## 2025-01-23 SDOH — HEALTH STABILITY: PHYSICAL HEALTH: ON AVERAGE, HOW MANY MINUTES DO YOU ENGAGE IN EXERCISE AT THIS LEVEL?: 30 MIN

## 2025-01-23 SDOH — HEALTH STABILITY: PHYSICAL HEALTH: ON AVERAGE, HOW MANY DAYS PER WEEK DO YOU ENGAGE IN MODERATE TO STRENUOUS EXERCISE (LIKE A BRISK WALK)?: 5 DAYS

## 2025-01-23 ASSESSMENT — SOCIAL DETERMINANTS OF HEALTH (SDOH): HOW OFTEN DO YOU GET TOGETHER WITH FRIENDS OR RELATIVES?: MORE THAN THREE TIMES A WEEK

## 2025-01-24 ENCOUNTER — TRANSFERRED RECORDS (OUTPATIENT)
Dept: HEALTH INFORMATION MANAGEMENT | Facility: CLINIC | Age: 65
End: 2025-01-24
Payer: COMMERCIAL

## 2025-01-27 ENCOUNTER — OFFICE VISIT (OUTPATIENT)
Dept: FAMILY MEDICINE | Facility: CLINIC | Age: 65
End: 2025-01-27
Payer: COMMERCIAL

## 2025-01-27 VITALS
HEIGHT: 72 IN | OXYGEN SATURATION: 96 % | BODY MASS INDEX: 31.83 KG/M2 | SYSTOLIC BLOOD PRESSURE: 146 MMHG | TEMPERATURE: 98 F | DIASTOLIC BLOOD PRESSURE: 91 MMHG | WEIGHT: 235 LBS | RESPIRATION RATE: 14 BRPM | HEART RATE: 65 BPM

## 2025-01-27 DIAGNOSIS — E78.5 HYPERLIPIDEMIA LDL GOAL <100: ICD-10-CM

## 2025-01-27 DIAGNOSIS — Z23 NEED FOR SHINGLES VACCINE: ICD-10-CM

## 2025-01-27 DIAGNOSIS — Z83.719 FH: COLON POLYPS: ICD-10-CM

## 2025-01-27 DIAGNOSIS — N18.2 CKD (CHRONIC KIDNEY DISEASE) STAGE 2, GFR 60-89 ML/MIN: ICD-10-CM

## 2025-01-27 DIAGNOSIS — R03.0 ELEVATED BP WITHOUT DIAGNOSIS OF HYPERTENSION: ICD-10-CM

## 2025-01-27 DIAGNOSIS — Z00.00 ROUTINE GENERAL MEDICAL EXAMINATION AT A HEALTH CARE FACILITY: Primary | ICD-10-CM

## 2025-01-27 PROCEDURE — 90750 HZV VACC RECOMBINANT IM: CPT | Performed by: FAMILY MEDICINE

## 2025-01-27 PROCEDURE — 90471 IMMUNIZATION ADMIN: CPT | Performed by: FAMILY MEDICINE

## 2025-01-27 PROCEDURE — 99397 PER PM REEVAL EST PAT 65+ YR: CPT | Mod: 25 | Performed by: FAMILY MEDICINE

## 2025-01-27 NOTE — PROGRESS NOTES
Preventive Care Visit  [facility]  Reagan Whitehead MD, Family Medicine  Jan 27, 2025      Assessment & Plan       ICD-10-CM    1. Routine general medical examination at a health care facility  Z00.00       2. Hyperlipidemia LDL goal <100  E78.5       3. Elevated BP without diagnosis of hypertension  R03.0       4. CKD (chronic kidney disease) stage 2, GFR 60-89 ml/min  N18.2       5. FH: colon polyps  Z83.719       6. Need for shingles vaccine  Z23 ZOSTER RECOMBINANT ADJUVANTED (SHINGRIX)        We discussed diet and exercise treatment for control of blood pressure and lipids  We will give him a Shingrix vaccine  He was advised to get a booster dose in 2 to 6 months  Plan a tentative recheck in 1 year      BMI  Estimated body mass index is 31.87 kg/m  as calculated from the following:    Height as of this encounter: 1.829 m (6').    Weight as of this encounter: 106.6 kg (235 lb).   Weight management plan: Discussed healthy diet and exercise guidelines    Counseling  Appropriate preventive services were addressed with this patient via screening, questionnaire, or discussion as appropriate for fall prevention, nutrition, physical activity, Tobacco-use cessation, social engagement, weight loss and cognition.  Checklist reviewing preventive services available has been given to the patient.  Reviewed patient's diet, addressing concerns and/or questions.         Tomasz Escalante is a 64 year old, presenting for the following:  Physical        1/27/2025     6:54 AM   Additional Questions   Roomed by rashad   Accompanied by self       Via the Health Maintenance questionnaire, the patient has reported the following services have been completed -Colonscopy: [facility] 2025-01-24, this information has been sent to the abstraction team.    CHAUNCEY Escalante returns today for his annual wellness/physical.  He has a form from work to be completed for this.  I had seen him last week for a preoperative history and  physical and we did lab work.  His lab results generally look good except for elevated lipid values and mildly elevated renal function results.  He remains on no routine oral prescription medications.  He will be having his knee replacement surgery in about 3 weeks.  He did have a colonoscopy a few days ago and that turned out well.  No polyps.  We have not gotten that report yet from [facility].    Health Care Directive  Patient does not have a Health Care Directive      1/23/2025   General Health   How would you rate your overall physical health? Good   Feel stress (tense, anxious, or unable to sleep) Only a little   (!) STRESS CONCERN      1/23/2025   Nutrition   Diet: Regular (no restrictions)         1/23/2025   Exercise   Days per week of moderate/strenous exercise 5 days   Average minutes spent exercising at this level 30 min         1/23/2025   Social Factors   Frequency of gathering with friends or relatives More than three times a week   Worry food won't last until get money to buy more No   Food not last or not have enough money for food? No   Do you have housing? (Housing is defined as stable permanent housing and does not include staying ouside in a car, in a tent, in an abandoned building, in an overnight shelter, or couch-surfing.) No   Are you worried about losing your housing? No   Lack of transportation? No   Unable to get utilities (heat,electricity)? No   Want help with housing or utility concern? No   (!) HOUSING CONCERN PRESENT      1/23/2025   Fall Risk   Fallen 2 or more times in the past year? No   Trouble with walking or balance? Yes           1/23/2025   Activities of Daily Living- Home Safety   Needs help with the following daily activites None of the above   Safety concerns in the home None of the above         1/23/2025   Dental   Dentist two times every year? Yes         1/23/2025   Hearing Screening   Hearing concerns? None of the above         1/23/2025   Driving Risk Screening    Patient/family members have concerns about driving No         1/23/2025   General Alertness/Fatigue Screening   Have you been more tired than usual lately? No         1/23/2025   Urinary Incontinence Screening   Bothered by leaking urine in past 6 months No         1/23/2025   TB Screening   Were you born outside of the [place]? No         Today's PHQ-2 Score:       1/26/2025     2:20 PM   PHQ-2 ( 1999 Pfizer)   Q1: Little interest or pleasure in doing things 0   Q2: Feeling down, depressed or hopeless 0   PHQ-2 Score 0    Q1: Little interest or pleasure in doing things Not at all   Q2: Feeling down, depressed or hopeless Not at all   PHQ-2 Score 0       Patient-reported           1/23/2025   Substance Use   Alcohol more than 3/day or more than 7/wk No   Do you have a current opioid prescription? No   How severe/bad is pain from 1 to 10? 10/10   Do you use any other substances recreationally? No     Social History     Tobacco Use    Smoking status: Never     Passive exposure: Never    Smokeless tobacco: Never   Vaping Use    Vaping status: Never Used   Substance Use Topics    Alcohol use: No    Drug use: No           1/23/2025   AAA Screening   Family history of Abdominal Aortic Aneurysm (AAA)? No   Last PSA:   PSA   Date Value Ref Range Status   08/08/2017 1.26 0 - 4 ug/L Final     Comment:     Assay Method:  Chemiluminescence using Siemens Vista analyzer     Prostate Specific Antigen Screen   Date Value Ref Range Status   08/30/2024 0.78 0.00 - 4.50 ng/mL Final     ASCVD Risk   The 10-year ASCVD risk score (Marcus GARCIA, et al., 2019) is: 18.4%    Values used to calculate the score:      Age: 64 years      Sex: Male      Is Non- : No      Diabetic: No      Tobacco smoker: No      Systolic Blood Pressure: 146 mmHg      Is BP treated: No      HDL Cholesterol: 41 mg/dL      Total Cholesterol: 214 mg/dL          Reviewed and updated as needed this visit by Provider                    Patient  Active Problem List   Diagnosis    MEDIAL MENISCUS TEAR - left    CHONDROMALACIA, KNEE - bilateral    Hyperlipidemia LDL goal <100    Obesity (BMI 30.0-34.9)    FH: colon polyps    CKD (chronic kidney disease) stage 2, GFR 60-89 ml/min     Past Surgical History:   Procedure Laterality Date    ARTHROSCOPY KNEE Left 10/01/2008    Dr. Correa    ARTHROSCOPY KNEE Right     Dr. Correa    ARTHROSCOPY KNEE Left 2/27/2015    Procedure: ARTHROSCOPY KNEE;  Surgeon: Reagan Youssef MD;  Location: [facility]    COLONOSCOPY  3-10-15    Repeat Colonoscopy in 5  yrs.    HC KNEE SCOPE,MED/LAT MENISECTOMY Right 09/10/2010    medial mensicus -- Dr. Youssef    ZC NONSPECIFIC PROCEDURE  1999    RT Middle finger tendon repair       Social History     Tobacco Use    Smoking status: Never     Passive exposure: Never    Smokeless tobacco: Never   Substance Use Topics    Alcohol use: No     Family History   Problem Relation Age of Onset    Hypertension Mother     Hyperlipidemia Mother     Anxiety Disorder Mother     Osteoporosis Mother     Thyroid Disease Mother     Breast Cancer Maternal Grandmother     Hyperlipidemia Sister     Other Cancer Brother         Thyroid    Thyroid Disease Brother     Other Cancer Brother         Throat and bladder    Anesthesia Reaction Maternal Half-Brother         Has a hard time when coming out of    Asthma Sister          Current Outpatient Medications   Medication Sig Dispense Refill    triamcinolone (KENALOG) 0.025 % external ointment Apply topically 2 times daily 454 g 1     No Known Allergies  Current providers sharing in care for this patient include:  Patient Care Team:  Reagan Whitehead MD as PCP - General  Reagan Whitehead MD as Assigned PCP    The following health maintenance items are reviewed in Epic and correct as of today:  Health Maintenance   Topic Date Due    ZOSTER IMMUNIZATION (1 of 2) Never done    ADVANCE CARE PLANNING  02/18/2020    COLORECTAL CANCER SCREENING  03/10/2020    COVID-19  Vaccine (4 - 2024-25 season) 09/01/2024    MEDICARE ANNUAL WELLNESS VISIT  09/04/2024    LIPID  01/20/2026    ANNUAL REVIEW OF HM ORDERS  01/20/2026    FALL RISK ASSESSMENT  01/27/2026    DTAP/TDAP/TD IMMUNIZATION (3 - Td or Tdap) 07/27/2027    GLUCOSE  01/20/2028    RSV VACCINE (1 - 1-dose 75+ series) 09/04/2034    HEPATITIS C SCREENING  Completed    HIV SCREENING  Completed    PHQ-2 (once per calendar year)  Completed    INFLUENZA VACCINE  Completed    Pneumococcal Vaccine: 50+ Years  Completed    HPV IMMUNIZATION  Aged Out    MENINGITIS IMMUNIZATION  Aged Out    RSV MONOCLONAL ANTIBODY  Aged Out         Review of Systems  Noncontributory except as above.  Constitutional, HEENT, cardiovascular, pulmonary, gi and gu systems are negative, except as otherwise noted.     Objective    Exam  BP (!) 146/91   Pulse 65   Temp 98  F (36.7  C)   Resp 14   Ht 6' (1.829 m)   Wt 235 lb (106.6 kg)   SpO2 96%   BMI 31.87 kg/m     Estimated body mass index is 31.87 kg/m  as calculated from the following:    Height as of this encounter: 6' (1.829 m).    Weight as of this encounter: 235 lb (106.6 kg).    Physical Exam  GENERAL: alert and no distress  EYES: Eyes grossly normal to inspection, PERRL and conjunctivae and sclerae normal  HENT: Grossly normal  NECK: no adenopathy, no asymmetry, masses, or scars  RESP: lungs clear to auscultation - no rales, rhonchi or wheezes  CV: regular rate and rhythm, normal S1 S2, no S3 or S4, no murmur, click or rub, no peripheral edema  ABDOMEN: soft, nontender, no hepatosplenomegaly, no masses   MS: no gross musculoskeletal defects noted, no edema  SKIN: no suspicious lesions or rashes  NEURO: Normal strength and tone, mentation intact and speech normal  PSYCH: mentation appears normal, affect normal/bright        1/27/2025   Mini Cog   Clock Draw Score 2 Normal   3 Item Recall 3 objects recalled   Mini Cog Total Score 5         Office Visit on 01/20/2025   Component Date Value Ref Range  Status    Sodium 01/20/2025 139  135 - 145 mmol/L Final    Potassium 01/20/2025 4.9  3.4 - 5.3 mmol/L Final    Chloride 01/20/2025 101  98 - 107 mmol/L Final    Carbon Dioxide (CO2) 01/20/2025 27  22 - 29 mmol/L Final    Anion Gap 01/20/2025 11  7 - 15 mmol/L Final    Urea Nitrogen 01/20/2025 26.7 (H)  8.0 - 23.0 mg/dL Final    Creatinine 01/20/2025 1.10  0.67 - 1.17 mg/dL Final    GFR Estimate 01/20/2025 74  >60 mL/min/1.73m2 Final    eGFR calculated using 2021 CKD-EPI equation.    Calcium 01/20/2025 10.4  8.8 - 10.4 mg/dL Final    Reference intervals for this test were updated on 7/16/2024 to reflect our healthy population more accurately. There may be differences in the flagging of prior results with similar values performed with this method. Those prior results can be interpreted in the context of the updated reference intervals.    Glucose 01/20/2025 98  70 - 99 mg/dL Final    Patient Fasting > 8hrs? 01/20/2025 Yes   Final    Cholesterol 01/20/2025 214 (H)  <200 mg/dL Final    Triglycerides 01/20/2025 180 (H)  <150 mg/dL Final    Direct Measure HDL 01/20/2025 41  >=40 mg/dL Final    LDL Cholesterol Calculated 01/20/2025 137 (H)  <100 mg/dL Final    Non HDL Cholesterol 01/20/2025 173 (H)  <130 mg/dL Final    Patient Fasting > 8hrs? 01/20/2025 Yes   Final    WBC Count 01/20/2025 7.0  4.0 - 11.0 10e3/uL Final    RBC Count 01/20/2025 5.74  4.40 - 5.90 10e6/uL Final    Hemoglobin 01/20/2025 16.6  13.3 - 17.7 g/dL Final    Hematocrit 01/20/2025 49.6  40.0 - 53.0 % Final    MCV 01/20/2025 86  78 - 100 fL Final    MCH 01/20/2025 28.9  26.5 - 33.0 pg Final    MCHC 01/20/2025 33.5  31.5 - 36.5 g/dL Final    RDW 01/20/2025 12.7  10.0 - 15.0 % Final    Platelet Count 01/20/2025 293  150 - 450 10e3/uL Final         Signed Electronically by: Reagan Whitehead MD

## 2025-02-17 ENCOUNTER — TRANSFERRED RECORDS (OUTPATIENT)
Dept: HEALTH INFORMATION MANAGEMENT | Facility: CLINIC | Age: 65
End: 2025-02-17
Payer: COMMERCIAL

## 2025-03-03 ENCOUNTER — TRANSFERRED RECORDS (OUTPATIENT)
Dept: HEALTH INFORMATION MANAGEMENT | Facility: CLINIC | Age: 65
End: 2025-03-03
Payer: COMMERCIAL

## 2025-03-31 ENCOUNTER — TRANSFERRED RECORDS (OUTPATIENT)
Dept: HEALTH INFORMATION MANAGEMENT | Facility: CLINIC | Age: 65
End: 2025-03-31
Payer: COMMERCIAL

## 2025-05-12 ENCOUNTER — TRANSFERRED RECORDS (OUTPATIENT)
Dept: HEALTH INFORMATION MANAGEMENT | Facility: CLINIC | Age: 65
End: 2025-05-12
Payer: COMMERCIAL